# Patient Record
Sex: MALE | Race: WHITE | NOT HISPANIC OR LATINO
[De-identification: names, ages, dates, MRNs, and addresses within clinical notes are randomized per-mention and may not be internally consistent; named-entity substitution may affect disease eponyms.]

---

## 2018-08-08 PROBLEM — Z00.00 ENCOUNTER FOR PREVENTIVE HEALTH EXAMINATION: Status: ACTIVE | Noted: 2018-08-08

## 2018-08-21 ENCOUNTER — RECORD ABSTRACTING (OUTPATIENT)
Age: 73
End: 2018-08-21

## 2018-08-21 DIAGNOSIS — Z87.19 PERSONAL HISTORY OF OTHER DISEASES OF THE DIGESTIVE SYSTEM: ICD-10-CM

## 2018-08-21 DIAGNOSIS — Z86.69 PERSONAL HISTORY OF OTHER DISEASES OF THE NERVOUS SYSTEM AND SENSE ORGANS: ICD-10-CM

## 2018-08-21 DIAGNOSIS — Z78.9 OTHER SPECIFIED HEALTH STATUS: ICD-10-CM

## 2018-08-21 DIAGNOSIS — Z87.39 PERSONAL HISTORY OF OTHER DISEASES OF THE MUSCULOSKELETAL SYSTEM AND CONNECTIVE TISSUE: ICD-10-CM

## 2018-08-21 LAB — HBA1C MFR BLD: 8

## 2018-08-21 RX ORDER — SERTRALINE HYDROCHLORIDE 100 MG/1
100 TABLET, FILM COATED ORAL DAILY
Refills: 0 | Status: ACTIVE | COMMUNITY

## 2018-08-21 RX ORDER — LANCETS 28 GAUGE
EACH MISCELLANEOUS
Refills: 0 | Status: ACTIVE | COMMUNITY

## 2018-08-21 RX ORDER — SIMVASTATIN 20 MG/1
20 TABLET, FILM COATED ORAL
Qty: 90 | Refills: 3 | Status: ACTIVE | COMMUNITY

## 2018-08-21 RX ORDER — PEN NEEDLE, DIABETIC 29 G X1/2"
NEEDLE, DISPOSABLE MISCELLANEOUS
Refills: 0 | Status: ACTIVE | COMMUNITY

## 2018-08-21 RX ORDER — PREGABALIN 75 MG/1
75 CAPSULE ORAL DAILY
Refills: 0 | Status: ACTIVE | COMMUNITY

## 2018-08-21 RX ORDER — ADHESIVE REMOVER
PACKET (EA) MISCELLANEOUS
Refills: 0 | Status: ACTIVE | COMMUNITY

## 2018-08-21 RX ORDER — PANTOPRAZOLE 40 MG/1
40 TABLET, DELAYED RELEASE ORAL DAILY
Refills: 0 | Status: ACTIVE | COMMUNITY

## 2018-08-21 RX ORDER — QUETIAPINE FUMARATE 25 MG/1
25 TABLET ORAL DAILY
Refills: 0 | Status: ACTIVE | COMMUNITY

## 2018-08-21 RX ORDER — ALBUTEROL SULFATE 90 UG/1
108 (90 BASE) AEROSOL, METERED RESPIRATORY (INHALATION) DAILY
Refills: 0 | Status: ACTIVE | COMMUNITY

## 2018-09-07 ENCOUNTER — LABORATORY RESULT (OUTPATIENT)
Age: 73
End: 2018-09-07

## 2018-09-07 ENCOUNTER — APPOINTMENT (OUTPATIENT)
Dept: ENDOCRINOLOGY | Facility: CLINIC | Age: 73
End: 2018-09-07
Payer: MEDICARE

## 2018-09-07 VITALS
WEIGHT: 229 LBS | BODY MASS INDEX: 31.02 KG/M2 | SYSTOLIC BLOOD PRESSURE: 112 MMHG | HEIGHT: 72 IN | DIASTOLIC BLOOD PRESSURE: 70 MMHG | HEART RATE: 81 BPM

## 2018-09-07 LAB — GLUCOSE BLDC GLUCOMTR-MCNC: 211

## 2018-09-07 PROCEDURE — 82962 GLUCOSE BLOOD TEST: CPT

## 2018-09-07 PROCEDURE — 99214 OFFICE O/P EST MOD 30 MIN: CPT | Mod: 25

## 2018-09-21 ENCOUNTER — APPOINTMENT (OUTPATIENT)
Dept: ENDOCRINOLOGY | Facility: CLINIC | Age: 73
End: 2018-09-21
Payer: MEDICARE

## 2018-09-21 PROCEDURE — 97803 MED NUTRITION INDIV SUBSEQ: CPT

## 2018-10-12 ENCOUNTER — APPOINTMENT (OUTPATIENT)
Dept: ENDOCRINOLOGY | Facility: CLINIC | Age: 73
End: 2018-10-12
Payer: MEDICARE

## 2018-10-12 PROCEDURE — G0108 DIAB MANAGE TRN  PER INDIV: CPT

## 2018-10-26 ENCOUNTER — TRANSCRIPTION ENCOUNTER (OUTPATIENT)
Age: 73
End: 2018-10-26

## 2018-11-21 ENCOUNTER — APPOINTMENT (OUTPATIENT)
Dept: ENDOCRINOLOGY | Facility: CLINIC | Age: 73
End: 2018-11-21
Payer: MEDICARE

## 2018-11-21 PROCEDURE — 95249 CONT GLUC MNTR PT PROV EQP: CPT

## 2018-12-06 ENCOUNTER — RECORD ABSTRACTING (OUTPATIENT)
Age: 73
End: 2018-12-06

## 2018-12-11 ENCOUNTER — APPOINTMENT (OUTPATIENT)
Dept: ENDOCRINOLOGY | Facility: CLINIC | Age: 73
End: 2018-12-11
Payer: MEDICARE

## 2018-12-11 VITALS
HEART RATE: 75 BPM | WEIGHT: 225 LBS | OXYGEN SATURATION: 97 % | BODY MASS INDEX: 30.48 KG/M2 | DIASTOLIC BLOOD PRESSURE: 70 MMHG | SYSTOLIC BLOOD PRESSURE: 110 MMHG | HEIGHT: 72 IN

## 2018-12-11 DIAGNOSIS — R90.82 WHITE MATTER DISEASE, UNSPECIFIED: ICD-10-CM

## 2018-12-11 DIAGNOSIS — G47.33 OBSTRUCTIVE SLEEP APNEA (ADULT) (PEDIATRIC): ICD-10-CM

## 2018-12-11 LAB — GLUCOSE BLDC GLUCOMTR-MCNC: 259

## 2018-12-11 PROCEDURE — 82962 GLUCOSE BLOOD TEST: CPT

## 2018-12-11 PROCEDURE — 99214 OFFICE O/P EST MOD 30 MIN: CPT | Mod: 25

## 2018-12-11 PROCEDURE — 95251 CONT GLUC MNTR ANALYSIS I&R: CPT

## 2019-01-28 ENCOUNTER — RX RENEWAL (OUTPATIENT)
Age: 74
End: 2019-01-28

## 2019-01-31 ENCOUNTER — MEDICATION RENEWAL (OUTPATIENT)
Age: 74
End: 2019-01-31

## 2019-02-05 ENCOUNTER — MEDICATION RENEWAL (OUTPATIENT)
Age: 74
End: 2019-02-05

## 2019-02-14 ENCOUNTER — RX RENEWAL (OUTPATIENT)
Age: 74
End: 2019-02-14

## 2019-02-19 ENCOUNTER — RECORD ABSTRACTING (OUTPATIENT)
Age: 74
End: 2019-02-19

## 2019-03-29 ENCOUNTER — APPOINTMENT (OUTPATIENT)
Dept: ENDOCRINOLOGY | Facility: CLINIC | Age: 74
End: 2019-03-29
Payer: MEDICARE

## 2019-03-29 VITALS
SYSTOLIC BLOOD PRESSURE: 120 MMHG | DIASTOLIC BLOOD PRESSURE: 80 MMHG | BODY MASS INDEX: 30.61 KG/M2 | HEIGHT: 72 IN | HEART RATE: 80 BPM | WEIGHT: 226 LBS

## 2019-03-29 LAB — GLUCOSE BLDC GLUCOMTR-MCNC: 219

## 2019-03-29 PROCEDURE — 95251 CONT GLUC MNTR ANALYSIS I&R: CPT

## 2019-03-29 PROCEDURE — 99214 OFFICE O/P EST MOD 30 MIN: CPT | Mod: 25

## 2019-03-29 RX ORDER — METFORMIN ER 500 MG 500 MG/1
500 TABLET ORAL TWICE DAILY
Refills: 0 | Status: DISCONTINUED | COMMUNITY
End: 2019-03-29

## 2019-03-29 NOTE — REVIEW OF SYSTEMS
[Fatigue] : fatigue [Blurry Vision] : blurred vision [Dysphonia] : dysphonia [Headache] : headaches [Polydipsia] : polydipsia [Decreased Appetite] : appetite not decreased [Recent Weight Gain (___ Lbs)] : no recent weight gain [Recent Weight Loss (___ Lbs)] : no recent weight loss [Visual Field Defect] : no visual field defect [Dysphagia] : no dysphagia [Neck Pain] : no neck pain [Chest Pain] : no chest pain [Palpitations] : no palpitations [Constipation] : no constipation [Diarrhea] : no diarrhea [Polyuria] : no polyuria [Dysuria] : no dysuria [Tremors] : no tremors [Depression] : no depression [Anxiety] : no anxiety [Cold Intolerance] : cold tolerant [Heat Intolerance] : heat tolerant [Easy Bruising] : no tendency for easy bruising [Swelling] : no swelling [FreeTextEntry2] : weight stable  [FreeTextEntry3] : sometimes [FreeTextEntry4] : only first thing in am  [de-identified] : hx of migraines

## 2019-03-29 NOTE — ASSESSMENT
[FreeTextEntry1] : 74 y/o male with Type 2 DM, Hyperlipidemia, and HTN. No recent labs. \par \par Plan: \par Type 2 DM: labs now \par - Increase Omnipod basal settings:\par 12am-12pm 2.0 to 2.3\par 12pm-4pm 1.8 to 2.0\par 4pm-12am 2.0 to 2.3\par - continue carb ratio and ISF\par - continue Dexcom G5\par - encouraged to place carbs in pump\par - educated on healthy food choices\par - encouraged to continue routine exercise\par - schedule appointment with CDE to review carb counting and pump re-fresher \par \par Hyperlipidemia: labs now, continue Simvastatin\par \par HTN: stable. continue current medication regimen \par \par Labs and follow up visit in 3 months. \par \par Plan reviewed with Dr. Forbes

## 2019-03-29 NOTE — PHYSICAL EXAM
[Alert] : alert [No Acute Distress] : no acute distress [Well Nourished] : well nourished [Well Developed] : well developed [Normal Sclera/Conjunctiva] : normal sclera/conjunctiva [EOMI] : extra ocular movement intact [Normal Hearing] : hearing was normal [Supple] : the neck was supple [No LAD] : no lymphadenopathy [Thyroid Not Enlarged] : the thyroid was not enlarged [No Thyroid Nodules] : there were no palpable thyroid nodules [Normal Rate and Effort] : normal respiratory rhythm and effort [No Accessory Muscle Use] : no accessory muscle use [Clear to Auscultation] : lungs were clear to auscultation bilaterally [Pedal Pulses Normal] : the pedal pulses are present [Normal Bowel Sounds] : normal bowel sounds [Not Tender] : non-tender [Soft] : abdomen soft [Normal Gait] : normal gait [Acanthosis Nigricans] : no acanthosis nigricans [Right Foot Was Examined] : right foot ~C was examined [Left Foot Was Examined] : left foot ~C was examined [Tenderness] : not tender [Erythema] : not erythematous [Normal] : normal [Full ROM] : with full range of motion [#1 Diminished] : number 1 was diminished [#2 Diminished] : number 2 was diminished [#3 Diminished] : number 3 was diminished [#4 Diminished] : number 4 was diminished [#5 Diminished] : number 5 was diminished [No Tremors] : no tremors [Oriented x3] : oriented to person, place, and time [Normal Insight/Judgement] : insight and judgment were intact [Normal Mood] : the mood was normal [de-identified] : trace of edema in left foot  [FreeTextEntry5] : trace of edema

## 2019-03-29 NOTE — HISTORY OF PRESENT ILLNESS
[FreeTextEntry1] : Quality: Type 2 DM\par Severity: moderate\par Duration: 1990s \par Onset:fatigue\par Modifying Factors: Better with insulin \par Associated Symptoms: neuropathy. nephropathy \par \par Current Regimen:\par Novolog via Omnipod\par \par - stopped Metformin r/t gi discomfort\par \par Self blood sugar monitoring: Dexcom G5 - download - average glucose 204, standard deviation 75\par Omnipod download - carbs are not consistently placed into pump, limited BS readings, average glucose 229\par Pt. reports of having high blood sugars, he was not wearing the pump for weeks r/t insurance issues, he re-stared wearing the pump a couple weeks ago \par \par exercise: walk daily\par \par Diet:\par B- oatmeal, eggs, toast\par L- skips\par D- steak, rice, noodles, pasta\par Snacks - occasional ice cream\par \par Date of last eye exam: 2018 (-) diabetic retinopathy \par Date of last foot exam:  2018\par Date of last flu vaccine: 2019\par Date of last Pneumovax: < 5 years ago

## 2019-04-01 ENCOUNTER — RX RENEWAL (OUTPATIENT)
Age: 74
End: 2019-04-01

## 2019-04-01 ENCOUNTER — RESULT REVIEW (OUTPATIENT)
Age: 74
End: 2019-04-01

## 2019-04-05 ENCOUNTER — RESULT REVIEW (OUTPATIENT)
Age: 74
End: 2019-04-05

## 2019-04-05 LAB
25(OH)D3 SERPL-MCNC: 16.8 NG/ML
ALBUMIN SERPL ELPH-MCNC: 4.6 G/DL
ALP BLD-CCNC: 87 U/L
ALT SERPL-CCNC: 24 U/L
ANION GAP SERPL CALC-SCNC: 16 MMOL/L
AST SERPL-CCNC: 25 U/L
BASOPHILS # BLD AUTO: 0.11 K/UL
BASOPHILS NFR BLD AUTO: 1.1 %
BILIRUB SERPL-MCNC: 0.4 MG/DL
BUN SERPL-MCNC: 18 MG/DL
CALCIUM SERPL-MCNC: 9.5 MG/DL
CHLORIDE SERPL-SCNC: 103 MMOL/L
CHOLEST SERPL-MCNC: 163 MG/DL
CHOLEST/HDLC SERPL: 4.4 RATIO
CO2 SERPL-SCNC: 23 MMOL/L
CREAT SERPL-MCNC: 1.19 MG/DL
EOSINOPHIL # BLD AUTO: 0.49 K/UL
EOSINOPHIL NFR BLD AUTO: 5 %
ESTIMATED AVERAGE GLUCOSE: 252 MG/DL
GLUCOSE SERPL-MCNC: 251 MG/DL
HBA1C MFR BLD HPLC: 10.4 %
HCT VFR BLD CALC: 44.1 %
HDLC SERPL-MCNC: 37 MG/DL
HGB BLD-MCNC: 14 G/DL
IMM GRANULOCYTES NFR BLD AUTO: 0.5 %
LDLC SERPL CALC-MCNC: 83 MG/DL
LYMPHOCYTES # BLD AUTO: 2.71 K/UL
LYMPHOCYTES NFR BLD AUTO: 27.7 %
MAGNESIUM SERPL-MCNC: 1.9 MG/DL
MAN DIFF?: NORMAL
MCHC RBC-ENTMCNC: 30.9 PG
MCHC RBC-ENTMCNC: 31.7 GM/DL
MCV RBC AUTO: 97.4 FL
MONOCYTES # BLD AUTO: 0.85 K/UL
MONOCYTES NFR BLD AUTO: 8.7 %
NEUTROPHILS # BLD AUTO: 5.59 K/UL
NEUTROPHILS NFR BLD AUTO: 57 %
PHOSPHATE SERPL-MCNC: 2.9 MG/DL
PLATELET # BLD AUTO: 185 K/UL
POTASSIUM SERPL-SCNC: 4.7 MMOL/L
PROT SERPL-MCNC: 7.8 G/DL
RBC # BLD: 4.53 M/UL
RBC # FLD: 13.4 %
SODIUM SERPL-SCNC: 142 MMOL/L
TRIGL SERPL-MCNC: 215 MG/DL
TSH SERPL-ACNC: 1.51 UIU/ML
VIT B12 SERPL-MCNC: 552 PG/ML
WBC # FLD AUTO: 9.8 K/UL

## 2019-04-07 ENCOUNTER — TRANSCRIPTION ENCOUNTER (OUTPATIENT)
Age: 74
End: 2019-04-07

## 2019-05-02 ENCOUNTER — RX RENEWAL (OUTPATIENT)
Age: 74
End: 2019-05-02

## 2019-05-03 ENCOUNTER — APPOINTMENT (OUTPATIENT)
Dept: ENDOCRINOLOGY | Facility: CLINIC | Age: 74
End: 2019-05-03

## 2019-05-22 ENCOUNTER — RX RENEWAL (OUTPATIENT)
Age: 74
End: 2019-05-22

## 2019-07-09 ENCOUNTER — APPOINTMENT (OUTPATIENT)
Dept: ENDOCRINOLOGY | Facility: CLINIC | Age: 74
End: 2019-07-09
Payer: MEDICARE

## 2019-07-09 VITALS
HEIGHT: 72 IN | DIASTOLIC BLOOD PRESSURE: 70 MMHG | BODY MASS INDEX: 30.48 KG/M2 | WEIGHT: 225 LBS | HEART RATE: 75 BPM | SYSTOLIC BLOOD PRESSURE: 136 MMHG

## 2019-07-09 LAB — GLUCOSE BLDC GLUCOMTR-MCNC: 172

## 2019-07-09 PROCEDURE — 82962 GLUCOSE BLOOD TEST: CPT

## 2019-07-09 PROCEDURE — 99214 OFFICE O/P EST MOD 30 MIN: CPT | Mod: 25

## 2019-07-14 NOTE — REVIEW OF SYSTEMS
[Recent Weight Gain (___ Lbs)] : no recent weight gain [Chest Pain] : no chest pain [Palpitations] : no palpitations [SOB on Exertion] : shortness of breath during exertion [Nausea] : no nausea [Vomiting] : no vomiting was observed [Polyuria] : no polyuria [Joint Pain] : joint pain [Pain/Numbness of Digits] : pain/numbness of digits [Polydipsia] : polydipsia [FreeTextEntry3] : no changes in vision [FreeTextEntry9] : B/L hands [de-identified] : B/L feet

## 2019-07-14 NOTE — PHYSICAL EXAM
[Alert] : alert [No Acute Distress] : no acute distress [Well Nourished] : well nourished [Well Developed] : well developed [Normal Sclera/Conjunctiva] : normal sclera/conjunctiva [EOMI] : extra ocular movement intact [No Proptosis] : no proptosis [Thyroid Not Enlarged] : the thyroid was not enlarged [No Thyroid Nodules] : there were no palpable thyroid nodules [No Respiratory Distress] : no respiratory distress [No Accessory Muscle Use] : no accessory muscle use [Clear to Auscultation] : lungs were clear to auscultation bilaterally [Normal Rate] : heart rate was normal  [Normal S1, S2] : normal S1 and S2 [No Edema] : there was no peripheral edema [Anterior Cervical Nodes] : anterior cervical nodes [Normal] : normal and non tender [Spine Straight] : spine straight [No Stigmata of Cushings Syndrome] : no stigmata of cushings syndrome [Acanthosis Nigricans] : no acanthosis nigricans [Normal Reflexes] : deep tendon reflexes were 2+ and symmetric [No Tremors] : no tremors [Oriented x3] : oriented to person, place, and time [de-identified] : occasional extra systole [de-identified] : flat affect

## 2019-07-14 NOTE — ASSESSMENT
[FreeTextEntry1] : 73 year old male with T2DM on CSII, also with hypertension, hyperlipidemia, and vitamin D and vitamin B deficiencies. He is having afternoon hypoglycemia and post prandial hyperglycemia with dinner.\par \par 1. T2DM- improving control   pt basal now set at \par 12 AM -12 AM - 3.0 unit per hour only \par \par will reduce  12 Am to 6 Am  2.75  as pt had overnight lows\par 6Am to 12AM- 3.0 unit per hour \par -willsee if  dexocm has different adhesive \par  if not can cosnider Moise \par \par  COntinue Premeal injecitons using 1:15 carb ratio- ( set in pump by pt already)  and sensitivity 1:50 .\par -Tighten ICR at 5pm from 1:8 to 1:7 to help with post prandial hyperglycemia after dinner.\par -Continue SMBG. Call office with lows.\par -Repeat A1c in 3 months.\par \par 2. Hypertension- controlled.\par -Continue current regimen.\par \par 3. Hyperlipidemia- mild triglyceride elevation.\par -Continue statin.\par -Low fat diet.\par -Repeat lipids in 3 months.\par \par 4. Vitamin D deficiency\par -Cont Vitamin D 2000 IU daily.\par -Repeat level in 3 months.

## 2019-07-14 NOTE — HISTORY OF PRESENT ILLNESS
[FreeTextEntry1] : Type: 2\par Severity: moderate\par Duration: years\par Associated symptoms: neuropathy\par Modifying factors: better with carb counting\par \par Current meds for glycemic control:\par Metofrmin 500 mg BID- does not take consistently because it causes stomach ache\par Omnipod with Novolog, changes infusion site every 2-3 days\par using Omnipod pump for basal rate-\par takes Novolog pens for inhjection for mealtime boluses - if does not- runs out of insuin too quickly \par SMBG has not been using DExcom - had reaction to adhesive - very itchy \par \par Last eye exam: July 2019 -, mild background DM retinopathy \par Last foot exam: November 2018, history of neuropathy\par Diet: carb counting\par Weight: stable\par Exercise: 3x per week, 20-30 minutes\par \par \par settings differetn in pump  than last time

## 2019-08-13 ENCOUNTER — RX RENEWAL (OUTPATIENT)
Age: 74
End: 2019-08-13

## 2019-08-15 ENCOUNTER — RX RENEWAL (OUTPATIENT)
Age: 74
End: 2019-08-15

## 2019-10-13 ENCOUNTER — RX RENEWAL (OUTPATIENT)
Age: 74
End: 2019-10-13

## 2019-11-25 LAB
HBA1C MFR BLD HPLC: 7.4
LDLC SERPL DIRECT ASSAY-MCNC: 67
MICROALBUMIN/CREAT 24H UR-RTO: 149.3

## 2019-11-26 ENCOUNTER — APPOINTMENT (OUTPATIENT)
Dept: ENDOCRINOLOGY | Facility: CLINIC | Age: 74
End: 2019-11-26
Payer: MEDICARE

## 2019-11-26 VITALS
HEART RATE: 74 BPM | WEIGHT: 240 LBS | BODY MASS INDEX: 32.51 KG/M2 | HEIGHT: 72 IN | SYSTOLIC BLOOD PRESSURE: 136 MMHG | OXYGEN SATURATION: 97 % | DIASTOLIC BLOOD PRESSURE: 78 MMHG

## 2019-11-26 LAB — GLUCOSE BLDC GLUCOMTR-MCNC: 156

## 2019-11-26 PROCEDURE — 82962 GLUCOSE BLOOD TEST: CPT

## 2019-11-26 PROCEDURE — 99214 OFFICE O/P EST MOD 30 MIN: CPT | Mod: 25

## 2019-11-26 NOTE — ASSESSMENT
[FreeTextEntry1] : 74 year old male with T2DM on CSII, also with hypertension, hyperlipidemia, and vitamin D and vitamin B deficiencies. \par 1. T2DM- improving control   pt basal now set at \par keep  basal on omnipod at 3 untis per hour \par  some variable numbers - Pt bolusing via injections \par \par  COntinue Premeal injecitons using 1:15 carb ratio- ( set in pump by pt already)  and sensitivity 1:50 .\par \par -Continue SMBG. Call office with lows.\par -Repeat A1c in 3 months.\par \par 2. Hypertension- controlled.\par -Continue current regimen.\par \par 3. Hyperlipidemia- mild triglyceride elevation.\par -Continue statin.\par -Low fat diet.\par -Repeat lipids in 3 months.\par \par 4. Vitamin D deficiency\par -Cont Vitamin D 2000 IU daily.\par -Repeat level in 3 months.\par \par 5. Knee and back pain- see orthopedist

## 2019-11-26 NOTE — HISTORY OF PRESENT ILLNESS
[FreeTextEntry1] : Type: 2\par Severity: moderate\par Duration: years\par Associated symptoms: neuropathy\par Modifying factors: better with carb counting\par \par Omnipod with Novolog, changes infusion site every 2-3 days\par using Omnipod pump for basal rate-\par takes Novolog pens for injection for mealtime boluses - if does not- runs out of insulin too quickly \par SMBG has not been using Dexcom - had reaction to adhesive - very itchy \par \par Last eye exam: July 2019 -, mild background DM retinopathy \par Last foot exam:July 2019 , history of neuropathy\par Diet: carb counting\par Weight: stable\par Exercise: 3x per week, 20-30 minutes\par \par \par has been feeling ok overall  been trying to rotate  infuons sites \par \par \par  some insomnia \par does watch TV at [employer]- uses [employer]  watches movie \par \par some lwoer back pain   and swelling in kneses-  did [place] PMD

## 2019-11-26 NOTE — REVIEW OF SYSTEMS
[SOB on Exertion] : shortness of breath during exertion [Joint Pain] : joint pain [Pain/Numbness of Digits] : pain/numbness of digits [Polydipsia] : polydipsia [Recent Weight Gain (___ Lbs)] : no recent weight gain [Chest Pain] : no chest pain [Palpitations] : no palpitations [Nausea] : no nausea [Vomiting] : no vomiting was observed [FreeTextEntry3] : no changes in vision [Polyuria] : no polyuria

## 2019-11-26 NOTE — PHYSICAL EXAM
[Alert] : alert [Well Nourished] : well nourished [No Acute Distress] : no acute distress [Well Developed] : well developed [Normal Sclera/Conjunctiva] : normal sclera/conjunctiva [EOMI] : extra ocular movement intact [No Proptosis] : no proptosis [No Respiratory Distress] : no respiratory distress [No Thyroid Nodules] : there were no palpable thyroid nodules [Thyroid Not Enlarged] : the thyroid was not enlarged [No Accessory Muscle Use] : no accessory muscle use [Normal Rate] : heart rate was normal  [Clear to Auscultation] : lungs were clear to auscultation bilaterally [No Edema] : there was no peripheral edema [Normal S1, S2] : normal S1 and S2 [Anterior Cervical Nodes] : anterior cervical nodes [No Stigmata of Cushings Syndrome] : no stigmata of cushings syndrome [Spine Straight] : spine straight [No Tremors] : no tremors [Normal Reflexes] : deep tendon reflexes were 2+ and symmetric [Oriented x3] : oriented to person, place, and time [Acanthosis Nigricans] : no acanthosis nigricans [Right Foot Was Examined] : right foot ~C was examined [Left Foot Was Examined] : left foot ~C was examined [Full ROM] : with full range of motion [Normal] : normal [2+] : 2+ in the dorsalis pedis [de-identified] : occasional extra systole

## 2019-12-06 ENCOUNTER — RX RENEWAL (OUTPATIENT)
Age: 74
End: 2019-12-06

## 2019-12-13 ENCOUNTER — RX RENEWAL (OUTPATIENT)
Age: 74
End: 2019-12-13

## 2019-12-23 ENCOUNTER — RX RENEWAL (OUTPATIENT)
Age: 74
End: 2019-12-23

## 2020-02-07 ENCOUNTER — RX RENEWAL (OUTPATIENT)
Age: 75
End: 2020-02-07

## 2020-03-04 LAB
HBA1C MFR BLD HPLC: 7.8
LDLC SERPL DIRECT ASSAY-MCNC: 188
MICROALBUMIN/CREAT 24H UR-RTO: 19

## 2020-03-05 ENCOUNTER — APPOINTMENT (OUTPATIENT)
Dept: ENDOCRINOLOGY | Facility: CLINIC | Age: 75
End: 2020-03-05
Payer: MEDICARE

## 2020-03-05 VITALS
OXYGEN SATURATION: 97 % | HEIGHT: 72 IN | BODY MASS INDEX: 31.83 KG/M2 | SYSTOLIC BLOOD PRESSURE: 110 MMHG | HEART RATE: 74 BPM | WEIGHT: 235 LBS | DIASTOLIC BLOOD PRESSURE: 80 MMHG

## 2020-03-05 DIAGNOSIS — R79.89 OTHER SPECIFIED ABNORMAL FINDINGS OF BLOOD CHEMISTRY: ICD-10-CM

## 2020-03-05 LAB — GLUCOSE BLDC GLUCOMTR-MCNC: 97

## 2020-03-05 PROCEDURE — 99214 OFFICE O/P EST MOD 30 MIN: CPT | Mod: 25

## 2020-03-05 PROCEDURE — 82962 GLUCOSE BLOOD TEST: CPT

## 2020-03-05 NOTE — REVIEW OF SYSTEMS
[SOB on Exertion] : shortness of breath during exertion [Joint Pain] : joint pain [Pain/Numbness of Digits] : pain/numbness of digits [Polydipsia] : polydipsia [Recent Weight Gain (___ Lbs)] : no recent weight gain [Chest Pain] : no chest pain [Palpitations] : no palpitations [Nausea] : no nausea [Vomiting] : no vomiting was observed [Polyuria] : no polyuria [FreeTextEntry3] : no changes in vision [FreeTextEntry9] : kne has been swollen  has bakers cyst - to see ortho tomorrow

## 2020-03-05 NOTE — PHYSICAL EXAM
[Alert] : alert [No Acute Distress] : no acute distress [Well Nourished] : well nourished [Well Developed] : well developed [Normal Sclera/Conjunctiva] : normal sclera/conjunctiva [EOMI] : extra ocular movement intact [No Proptosis] : no proptosis [Thyroid Not Enlarged] : the thyroid was not enlarged [No Thyroid Nodules] : there were no palpable thyroid nodules [No Respiratory Distress] : no respiratory distress [No Accessory Muscle Use] : no accessory muscle use [Clear to Auscultation] : lungs were clear to auscultation bilaterally [Normal Rate] : heart rate was normal  [Normal S1, S2] : normal S1 and S2 [No Edema] : there was no peripheral edema [Anterior Cervical Nodes] : anterior cervical nodes [Spine Straight] : spine straight [No Stigmata of Cushings Syndrome] : no stigmata of cushings syndrome [Right Foot Was Examined] : right foot ~C was examined [Left Foot Was Examined] : left foot ~C was examined [Normal] : normal [Full ROM] : with full range of motion [2+] : 2+ in the dorsalis pedis [Normal Reflexes] : deep tendon reflexes were 2+ and symmetric [No Tremors] : no tremors [Oriented x3] : oriented to person, place, and time [Acanthosis Nigricans] : no acanthosis nigricans

## 2020-03-05 NOTE — ASSESSMENT
[FreeTextEntry1] : 74 year old male with T2DM on CSII, also with hypertension, hyperlipidemia, and vitamin D and vitamin B deficiencies. \par 1. T2DM- with some PM hypoglycemia \par will reduce basal rate  to 2.75 u/hr\par pt does not want to do carb counting - wants to do things the way he has bee n with bolusing with novolog pen \par \par intereseted in OmniPOd  DAsh \par \par  He does not use carb ratio inpump or CF .\par \par -Continue SMBG. Call office with lows.\par -Repeat A1c in 3 months.\par \par 2. Hypertension- controlled.\par -Continue current regimen.\par \par 3. Hyperlipidemia- mild triglyceride elevation.\par -Continue statin.\par -Low fat diet.\par -Repeat lipids in 3 months.\par \par 4. Vitamin D deficiency\par -Cont Vitamin D 2000 IU daily.\par -Repeat level in 3 months.\par \par 5. Knee and back pain- seeing orthopedist  or

## 2020-03-05 NOTE — HISTORY OF PRESENT ILLNESS
[FreeTextEntry1] : Type: 2\par Severity: moderate\par Duration: years\par Associated symptoms: neuropathy\par Modifying factors:  pt  does not carb count\par really uses \par \par Omnipod with Novolog, changes infusion site every 2-3 days\par using Omnipod pump for basal rate-\par takes Novolog pens for injection for mealtime boluses - f takae 8-12 untis if has full cup of ricwe and  bread \par SMBG has not been using Dexcom - had reaction to adhesive - very itchy \par \par Last eye exam: July 2019 -, mild background DM retinopathy \par Last foot exam:July 2019 , history of neuropathy\par Diet: carb counting\par Weight: stable\par Exercise: 3x per week, 20-30 minutes\par \par \par has been feeling ok overall  been trying to rotate  iinfusion sets \par \par \par  some insomnia \par does watch TV at [employer]- uses [employer]  watches movie \par \par some lwoer back pain   and swelling in kneses-  did [place] PMD

## 2020-04-28 ENCOUNTER — RX RENEWAL (OUTPATIENT)
Age: 75
End: 2020-04-28

## 2020-06-16 ENCOUNTER — RX RENEWAL (OUTPATIENT)
Age: 75
End: 2020-06-16

## 2020-07-23 ENCOUNTER — APPOINTMENT (OUTPATIENT)
Dept: ENDOCRINOLOGY | Facility: CLINIC | Age: 75
End: 2020-07-23
Payer: MEDICARE

## 2020-07-23 VITALS
WEIGHT: 226 LBS | HEART RATE: 76 BPM | DIASTOLIC BLOOD PRESSURE: 74 MMHG | SYSTOLIC BLOOD PRESSURE: 126 MMHG | BODY MASS INDEX: 30.61 KG/M2 | HEIGHT: 72 IN

## 2020-07-23 PROCEDURE — 99214 OFFICE O/P EST MOD 30 MIN: CPT | Mod: 25

## 2020-07-23 PROCEDURE — 82962 GLUCOSE BLOOD TEST: CPT

## 2020-07-27 NOTE — REVIEW OF SYSTEMS
[Chest Pain] : no chest pain [Palpitations] : no palpitations [SOB on Exertion] : shortness of breath during exertion [Recent Weight Gain (___ Lbs)] : no recent weight gain [Vomiting] : no vomiting was observed [Polyuria] : no polyuria [Nausea] : no nausea [Pain/Numbness of Digits] : pain/numbness of digits [Polydipsia] : polydipsia [Joint Pain] : joint pain [FreeTextEntry9] : kne has been swollen  has bakers cyst - to see ortho tomorrow  [FreeTextEntry3] : no changes in vision

## 2020-07-27 NOTE — PHYSICAL EXAM
[Alert] : alert [Normal Sclera/Conjunctiva] : normal sclera/conjunctiva [Well Developed] : well developed [No Acute Distress] : no acute distress [Well Nourished] : well nourished [No Proptosis] : no proptosis [EOMI] : extra ocular movement intact [Normal Oropharynx] : the oropharynx was normal [No Accessory Muscle Use] : no accessory muscle use [No Respiratory Distress] : no respiratory distress [Thyroid Not Enlarged] : the thyroid was not enlarged [No Thyroid Nodules] : no palpable thyroid nodules [Normal S1, S2] : normal S1 and S2 [Clear to Auscultation] : lungs were clear to auscultation bilaterally [Normal Rate] : heart rate was normal [Regular Rhythm] : with a regular rhythm [No Edema] : no peripheral edema [Pedal Pulses Normal] : the pedal pulses are present [Normal Posterior Cervical Nodes] : no posterior cervical lymphadenopathy [No Spinal Tenderness] : no spinal tenderness [Normal Anterior Cervical Nodes] : no anterior cervical lymphadenopathy [Normal Gait] : normal gait [No Stigmata of Cushings Syndrome] : no stigmata of Cushings Syndrome [Spine Straight] : spine straight [Normal Strength/Tone] : muscle strength and tone were normal [No Rash] : no rash [Acanthosis Nigricans] : no acanthosis nigricans [Diminished Throughout Both Feet] : normal tactile sensation with monofilament testing throughout both feet [Full ROM] : with full range of motion [Normal] : normal [Normal Reflexes] : deep tendon reflexes were 2+ and symmetric [No Tremors] : no tremors [Oriented x3] : oriented to person, place, and time

## 2020-07-27 NOTE — ASSESSMENT
[FreeTextEntry1] : 74 year old male with T2DM on CSII, also with hypertension, hyperlipidemia, and vitamin D and vitamin B deficiencies. \par 1. T2DM- using Omnipod dash \par cont curent RX e basal rate  to 2.75 u/hr\par pt does not want to do carb counting - wants to do things the way he has bee n with bolusing with novolog pen \par \par \par  He does not use carb ratio inpump or CF .\par \par -Continue SMBG. Call office with lows.\par -Repeat A1c in 3 months.\par \par 2. Hypertension- controlled.\par -Continue current regimen.\par \par 3. Hyperlipidemia- mild triglyceride elevation.\par -Continue statin.\par -Low fat diet.\par -Repeat lipids in 3 months.\par \par 4. Vitamin D deficiency\par -Cont Vitamin D 2000 IU daily.\par -Repeat level in 3 months.\par \par 5. Knee and back pain- seeing orthopedist  or

## 2020-09-09 ENCOUNTER — APPOINTMENT (OUTPATIENT)
Dept: ENDOCRINOLOGY | Facility: CLINIC | Age: 75
End: 2020-09-09
Payer: COMMERCIAL

## 2020-09-09 PROCEDURE — P0005: CPT

## 2020-09-11 RX ORDER — BLOOD SUGAR DIAGNOSTIC
STRIP MISCELLANEOUS
Qty: 400 | Refills: 1 | Status: ACTIVE | COMMUNITY
Start: 2020-09-11 | End: 1900-01-01

## 2020-09-11 RX ORDER — BLOOD SUGAR DIAGNOSTIC
STRIP MISCELLANEOUS
Qty: 400 | Refills: 1 | Status: DISCONTINUED | COMMUNITY
Start: 2019-02-14 | End: 2020-09-11

## 2020-09-11 RX ORDER — BLOOD SUGAR DIAGNOSTIC
STRIP MISCELLANEOUS
Refills: 0 | Status: DISCONTINUED | COMMUNITY
End: 2020-09-11

## 2020-09-24 RX ORDER — GLUCAGON INJECTION, SOLUTION 1 MG/.2ML
1 INJECTION, SOLUTION SUBCUTANEOUS
Qty: 1 | Refills: 3 | Status: ACTIVE | COMMUNITY
Start: 2020-09-11 | End: 1900-01-01

## 2020-10-16 ENCOUNTER — RX RENEWAL (OUTPATIENT)
Age: 75
End: 2020-10-16

## 2020-10-18 ENCOUNTER — RX RENEWAL (OUTPATIENT)
Age: 75
End: 2020-10-18

## 2020-10-21 RX ORDER — INSULIN ASPART 100 [IU]/ML
100 INJECTION, SOLUTION INTRAVENOUS; SUBCUTANEOUS
Qty: 90 | Refills: 1 | Status: ACTIVE | COMMUNITY
Start: 2020-06-16 | End: 1900-01-01

## 2020-12-07 ENCOUNTER — APPOINTMENT (OUTPATIENT)
Dept: ENDOCRINOLOGY | Facility: CLINIC | Age: 75
End: 2020-12-07
Payer: MEDICARE

## 2020-12-07 VITALS
HEART RATE: 79 BPM | DIASTOLIC BLOOD PRESSURE: 80 MMHG | OXYGEN SATURATION: 94 % | BODY MASS INDEX: 31.15 KG/M2 | WEIGHT: 230 LBS | HEIGHT: 72 IN | SYSTOLIC BLOOD PRESSURE: 120 MMHG

## 2020-12-07 LAB — GLUCOSE BLDC GLUCOMTR-MCNC: 155

## 2020-12-07 PROCEDURE — 82962 GLUCOSE BLOOD TEST: CPT

## 2020-12-07 PROCEDURE — 99214 OFFICE O/P EST MOD 30 MIN: CPT | Mod: 25

## 2020-12-07 RX ORDER — FENOFIBRATE 160 MG/1
160 TABLET ORAL
Qty: 90 | Refills: 0 | Status: ACTIVE | COMMUNITY
Start: 2020-12-02

## 2020-12-07 RX ORDER — ISOPROPYL ALCOHOL 0.7 ML/ML
SWAB TOPICAL
Qty: 4 | Refills: 3 | Status: ACTIVE | COMMUNITY
Start: 1900-01-01 | End: 1900-01-01

## 2020-12-07 NOTE — PHYSICAL EXAM
[Alert] : alert [Well Nourished] : well nourished [No Acute Distress] : no acute distress [Well Developed] : well developed [Normal Sclera/Conjunctiva] : normal sclera/conjunctiva [EOMI] : extra ocular movement intact [No Proptosis] : no proptosis [Normal Oropharynx] : the oropharynx was normal [Thyroid Not Enlarged] : the thyroid was not enlarged [No Thyroid Nodules] : no palpable thyroid nodules [No Respiratory Distress] : no respiratory distress [No Accessory Muscle Use] : no accessory muscle use [Clear to Auscultation] : lungs were clear to auscultation bilaterally [Normal S1, S2] : normal S1 and S2 [Normal Rate] : heart rate was normal [Regular Rhythm] : with a regular rhythm [No Edema] : no peripheral edema [Pedal Pulses Normal] : the pedal pulses are present [Normal Anterior Cervical Nodes] : no anterior cervical lymphadenopathy [No Stigmata of Cushings Syndrome] : no stigmata of Cushings Syndrome [Normal Gait] : normal gait [Normal Strength/Tone] : muscle strength and tone were normal [No Rash] : no rash [Normal] : normal [Full ROM] : with full range of motion [Normal Reflexes] : deep tendon reflexes were 2+ and symmetric [No Tremors] : no tremors [Oriented x3] : oriented to person, place, and time [Acanthosis Nigricans] : no acanthosis nigricans [Diminished Throughout Both Feet] : normal tactile sensation with monofilament testing throughout both feet [de-identified] : legft neck muscles tense

## 2020-12-07 NOTE — ASSESSMENT
[FreeTextEntry1] : 74 year old male with T2DM on CSII, also with hypertension, hyperlipidemia, and vitamin D and vitamin B deficiencies. \par 1. T2DM- using Omnipod dash   not available for download today \par cont curent RX e basal rate  to 2.75 u/hr\par pt does not want to do carb counting - wants to do things the way he has bee n with bolusing with novolog pen \par \par -Continue SMBG. Call office with lows.\par -await BW from \par PMD \par \par 2. Hypertension- controlled.\par -Continue current regimen.\par \par 3. Hyperlipidemia- mild triglyceride elevation.\par -Continue statin.\par -Low fat diet.\par -Repeat lipids in 3 months.\par \par 4. Vitamin D deficiency\par -Cont Vitamin D 50K per week - need updated labs\par -Repeat level in 3 months.\par \par \par 5 Dyspnea due to COvid on pred taper- will follwoupwith pulm

## 2020-12-07 NOTE — HISTORY OF PRESENT ILLNESS
[FreeTextEntry1] : Type: 2\par Severity: moderate\par Duration: years\par Associated symptoms: neuropathy\par Modifying factors:  pt  does not carb count\par \par Omnipod with Novolog, changes infusion site every 2-3 days\par using Omnipod pump for basal rate-\par takes Novolog pens for injection for mealtime boluses - takign 18 untis tid ac  if has full cup of ricwe and  bread \par \par has been on prednsiosne taper for past 5 days from COVID from [facility] \par \par  SMBG has not been using Dexcom - had reaction to adhesive - very itchy \par \par Last eye exam: July 2019 -, mild background DM retinopathy \par Last foot exam:July 2019 , history of neuropathy\par Diet: carb counting\par Weight: stable\par Exercise: linmited by dyspnea \par \par \par has been feeling ok overall  been trying to rotate  iinfusion sets \par \par \par  did twist ankle about 1 month ago- was swollen  now better

## 2021-01-15 ENCOUNTER — RX RENEWAL (OUTPATIENT)
Age: 76
End: 2021-01-15

## 2021-02-23 ENCOUNTER — LABORATORY RESULT (OUTPATIENT)
Age: 76
End: 2021-02-23

## 2021-03-08 ENCOUNTER — APPOINTMENT (OUTPATIENT)
Dept: ENDOCRINOLOGY | Facility: CLINIC | Age: 76
End: 2021-03-08
Payer: MEDICARE

## 2021-03-08 ENCOUNTER — RX RENEWAL (OUTPATIENT)
Age: 76
End: 2021-03-08

## 2021-03-08 VITALS
HEART RATE: 69 BPM | SYSTOLIC BLOOD PRESSURE: 120 MMHG | DIASTOLIC BLOOD PRESSURE: 80 MMHG | OXYGEN SATURATION: 96 % | HEIGHT: 72 IN | BODY MASS INDEX: 31.83 KG/M2 | WEIGHT: 235 LBS

## 2021-03-08 PROCEDURE — 99214 OFFICE O/P EST MOD 30 MIN: CPT

## 2021-03-08 RX ORDER — DONEPEZIL HYDROCHLORIDE 10 MG/1
10 TABLET ORAL
Qty: 30 | Refills: 0 | Status: ACTIVE | COMMUNITY
Start: 2020-12-15

## 2021-03-08 RX ORDER — AZELASTINE HYDROCHLORIDE 0.5 MG/ML
0.05 SOLUTION/ DROPS OPHTHALMIC
Qty: 6 | Refills: 0 | Status: ACTIVE | COMMUNITY
Start: 2021-02-04

## 2021-04-06 ENCOUNTER — APPOINTMENT (OUTPATIENT)
Dept: ENDOCRINOLOGY | Facility: CLINIC | Age: 76
End: 2021-04-06
Payer: MEDICARE

## 2021-04-06 PROCEDURE — G0108 DIAB MANAGE TRN  PER INDIV: CPT

## 2021-04-06 RX ORDER — FLASH GLUCOSE SENSOR
KIT MISCELLANEOUS
Qty: 2 | Refills: 5 | Status: ACTIVE | COMMUNITY
Start: 2021-04-06 | End: 1900-01-01

## 2021-04-13 NOTE — ASSESSMENT
[FreeTextEntry1] : 74 year old male with T2DM on CSII, also with hypertension, hyperlipidemia, and vitamin D and vitamin B deficiencies. \par 1. T2DM- using Omnipod dash   not available for download today \par cont curent RX e basal rate  to 2.75 u/hr\par pt does not want to do carb counting - wants to do things the way he has bee n with bolusing with novolog pen \par \par -Continue SMBG. Call office with lows.\par -await BW from \par PMD \par \par 2. Hypertension- controlled.\par -Continue current regimen.\par \par 3. Hyperlipidemia- mild triglyceride elevation.\par -Continue statin.\par -Low fat diet.\par -Repeat lipids in 3 months.\par \par 4. Vitamin D deficiency\par -Cont Vitamin D 50K per week - need updated labs\par -Repeat level in 3 months.\par \par \par 5 Dyspnea due to COvid- will follwoupwith pulm \par \par 6. INcCreatinin- pt recalls beruchi told his  kideny s were off in hospital last year [facility]- thinks he saw Dr Aparicio and Dr Daley - give inc Creatinein- will make followup appt with tehir office \par \par \par Glucose Sensor Necessity:  This patient with diabetes performs 4 or more glucose checks per day utilizing a home blood glucose monitor.  The patient is treated with insulin via 3 or more injections daily plus a subcutaneous insulin infusion pump.  This patient requires frequent adjustments to their insulin treatment on the basis of therapeutic continuous glucose monitoring results.  In addition, the patient has been to our office for an evaluation of their diabetes control within the past 6 months.  \par \par

## 2021-04-13 NOTE — PHYSICAL EXAM
[Alert] : alert [Well Nourished] : well nourished [No Acute Distress] : no acute distress [Well Developed] : well developed [Normal Sclera/Conjunctiva] : normal sclera/conjunctiva [EOMI] : extra ocular movement intact [No Proptosis] : no proptosis [Normal Oropharynx] : the oropharynx was normal [Thyroid Not Enlarged] : the thyroid was not enlarged [No Thyroid Nodules] : no palpable thyroid nodules [No Respiratory Distress] : no respiratory distress [No Accessory Muscle Use] : no accessory muscle use [Clear to Auscultation] : lungs were clear to auscultation bilaterally [Normal S1, S2] : normal S1 and S2 [Normal Rate] : heart rate was normal [Regular Rhythm] : with a regular rhythm [No Edema] : no peripheral edema [Pedal Pulses Normal] : the pedal pulses are present [Normal Anterior Cervical Nodes] : no anterior cervical lymphadenopathy [No Stigmata of Cushings Syndrome] : no stigmata of Cushings Syndrome [Normal Gait] : normal gait [Normal Strength/Tone] : muscle strength and tone were normal [No Rash] : no rash [Normal] : normal [Full ROM] : with full range of motion [Normal Reflexes] : deep tendon reflexes were 2+ and symmetric [No Tremors] : no tremors [Oriented x3] : oriented to person, place, and time [Acanthosis Nigricans] : no acanthosis nigricans [Diminished Throughout Both Feet] : normal tactile sensation with monofilament testing throughout both feet [de-identified] : legft neck muscles tense

## 2021-04-13 NOTE — HISTORY OF PRESENT ILLNESS
[FreeTextEntry1] : Type: 2\par Severity: moderate\par Duration: years\par Associated symptoms: neuropathy\par Modifying factors:  pt  does not carb count\par HAd COvid [facility] 2020\par \par Omnipod with Novolog, changes infusion site every 2-3 days\par using Omnipod pump for basal rate-\par takes Novolog pens for injection for mealtime boluses - takign 18 untis tid ac  if has full cup of ricwe and  bread \par \par was on steroids in Feb for sinus infeciton - BS ran higher \par \par has been on prednsiosne taper for past 5 days from COVID from [facility] \par \par  SMBG has not been using Dexcom - had reaction to adhesive - very itchy \par \par Last eye exam: July 2019 -, mild background DM retinopathy \par Last foot exam:July 2019 , history of neuropathy\par Diet: carb counting\par Weight: stable\par Exercise: linmited by dyspnea \par \par \par has been feeling ok overall  been trying to rotate  iinfusion sets

## 2021-06-29 ENCOUNTER — RX RENEWAL (OUTPATIENT)
Age: 76
End: 2021-06-29

## 2021-07-16 ENCOUNTER — RX RENEWAL (OUTPATIENT)
Age: 76
End: 2021-07-16

## 2021-07-16 RX ORDER — PEN NEEDLE, DIABETIC 29 G X1/2"
31G X 5 MM NEEDLE, DISPOSABLE MISCELLANEOUS
Qty: 400 | Refills: 1 | Status: ACTIVE | COMMUNITY
Start: 2019-01-28 | End: 1900-01-01

## 2021-09-23 ENCOUNTER — INPATIENT (INPATIENT)
Facility: HOSPITAL | Age: 76
LOS: 1 days | Discharge: HOME | End: 2021-09-25
Admitting: FAMILY MEDICINE
Payer: MEDICARE

## 2021-09-23 PROCEDURE — 71045 X-RAY EXAM CHEST 1 VIEW: CPT | Mod: 26

## 2021-09-23 PROCEDURE — 99285 EMERGENCY DEPT VISIT HI MDM: CPT | Mod: CS

## 2021-09-24 PROCEDURE — 71250 CT THORAX DX C-: CPT | Mod: 26

## 2021-09-27 ENCOUNTER — APPOINTMENT (OUTPATIENT)
Dept: CARE COORDINATION | Facility: HOME HEALTH | Age: 76
End: 2021-09-27
Payer: MEDICARE

## 2021-09-27 VITALS
HEART RATE: 61 BPM | SYSTOLIC BLOOD PRESSURE: 120 MMHG | DIASTOLIC BLOOD PRESSURE: 70 MMHG | OXYGEN SATURATION: 97 % | TEMPERATURE: 97.2 F | RESPIRATION RATE: 15 BRPM

## 2021-09-27 DIAGNOSIS — J18.9 PNEUMONIA, UNSPECIFIED ORGANISM: ICD-10-CM

## 2021-09-27 DIAGNOSIS — K13.79 OTHER LESIONS OF ORAL MUCOSA: ICD-10-CM

## 2021-09-27 PROCEDURE — 99495 TRANSJ CARE MGMT MOD F2F 14D: CPT

## 2021-09-27 RX ORDER — PREDNISONE 10 MG/1
10 TABLET ORAL
Qty: 30 | Refills: 0 | Status: DISCONTINUED | COMMUNITY
Start: 2020-12-02 | End: 2021-09-27

## 2021-09-27 RX ORDER — MUPIROCIN 20 MG/G
2 OINTMENT TOPICAL
Qty: 22 | Refills: 0 | Status: COMPLETED | COMMUNITY
Start: 2019-02-05 | End: 2021-09-27

## 2021-09-27 RX ORDER — CEFDINIR 300 MG/1
300 CAPSULE ORAL
Refills: 0 | Status: ACTIVE | COMMUNITY

## 2021-09-27 RX ORDER — FLUTICASONE PROPIONATE AND SALMETEROL XINAFOATE 115; 21 UG/1; UG/1
115-21 AEROSOL, METERED RESPIRATORY (INHALATION)
Qty: 12 | Refills: 0 | Status: DISCONTINUED | COMMUNITY
Start: 2020-12-02 | End: 2021-09-27

## 2021-09-27 RX ORDER — GUAIFENESIN 600 MG/1
600 TABLET, EXTENDED RELEASE ORAL
Refills: 0 | Status: ACTIVE | COMMUNITY

## 2021-09-27 RX ORDER — DONEPEZIL HYDROCHLORIDE 5 MG/1
5 TABLET ORAL
Qty: 90 | Refills: 0 | Status: DISCONTINUED | COMMUNITY
Start: 2020-12-15 | End: 2021-09-27

## 2021-09-27 RX ORDER — DOXYCYCLINE HYCLATE 100 MG/1
100 TABLET ORAL
Qty: 14 | Refills: 0 | Status: COMPLETED | COMMUNITY
Start: 2021-03-06 | End: 2021-09-27

## 2021-09-27 NOTE — PHYSICAL EXAM
[No Acute Distress] : no acute distress [Well Nourished] : well nourished [Well Developed] : well developed [Normal Oropharynx] : the oropharynx was normal [No Respiratory Distress] : no respiratory distress  [Clear to Auscultation] : lungs were clear to auscultation bilaterally [No Accessory Muscle Use] : no accessory muscle use [Normal Rate] : normal rate  [Regular Rhythm] : with a regular rhythm [Normal S1, S2] : normal S1 and S2 [No Murmur] : no murmur heard [Pedal Pulses Present] : the pedal pulses are present [No Edema] : there was no peripheral edema [Soft] : abdomen soft [Non-distended] : non-distended [Normal Bowel Sounds] : normal bowel sounds [No Joint Swelling] : no joint swelling [Grossly Normal Strength/Tone] : grossly normal strength/tone [No Rash] : no rash [No Skin Lesions] : no skin lesions [Normal Gait] : normal gait [Coordination Grossly Intact] : coordination grossly intact [Normal Affect] : the affect was normal [Alert and Oriented x3] : oriented to person, place, and time [Normal Mood] : the mood was normal [de-identified] : approx 6mm area of redness mid hard palate. No ulceration or surrounding tissue necrosis noted.

## 2021-09-27 NOTE — COUNSELING
[Fall prevention counseling provided] : Fall prevention counseling provided [Adequate lighting] : Adequate lighting [No throw rugs] : No throw rugs [Use proper foot wear] : Use proper foot wear [None] : None [Good understanding] : Patient has a good understanding of lifestyle changes and steps needed to achieve self management goal

## 2021-09-27 NOTE — ASSESSMENT
[FreeTextEntry1] : Nba Sparks is being seen after discharge home from [facility]. He was admitted on 9/23/21 for PNA and discharged home on 9/25/21.  Discharge medications were reviewed and reconciled with the current medication list and medications in home.\par \par 1)PNA-resolving\par No worsening symptoms, no cough, SOB,wheezing. Pt reports fatigue, but able to perform ADL's.\par Finish Cefdinir BID\par Mucinex q12hr for congestion\par [facility] TCM  teaching: Advised patient to adhere to all medications including demonstrating proper use of inhalers and nebulizers. Encourage the use of proper breathing techniques such as pursed lip breathing or leaning forward during exhalation.Increase activity as tolerated and maintain optimal activity levels. Continue coughing and deep breathing exercises including use of Incentive Spirometry. Receive routine pneumococcal and influenza vaccinations. Identify early signs and sx of disease and notify NP/MD. Maintain proper nutrition and hydration. Follow up with Pulmonologist within 7 days of discharge. Contact information given, patient advised to call with any questions/concerns. \par \par 2)Mouth sores-c/o inability to eat due to sore and pain in roof of mouth\par 6mm red area in middle of hard palate-no ulceration or surrounding skin irritation\par Pt using salt water rinse.\par Use Anbesol 3-4times a day with a q-tip\par Cont warm salt water rinse after meals\par Hydrate well, call for any worsening symptoms or increase in mouth soreness\par \par 3) Diabetes-HgbA1c-8.5\par Pt using freestyle Moise, insulin pump.\par Am BG-134\par Enc low carbohydrate lifestyle\par f/u w/endocrinologist\par \par Reminded of TCM program and encouraged pt to call with any questions or concerns and especially with worsening of symptoms. Verbalized understanding.\par \par \par \par

## 2021-09-27 NOTE — REVIEW OF SYSTEMS
[Fever] : no fever [Chills] : no chills [Fatigue] : fatigue [Negative] : Psychiatric [FreeTextEntry4] : Soreness reported in mouth making it difficult to eat

## 2021-09-27 NOTE — HISTORY OF PRESENT ILLNESS
[Post-hospitalization from ___ Hospital] : Post-hospitalization from [unfilled] Hospital [Admitted on: ___] : The patient was admitted on [unfilled] [Discharged on ___] : discharged on [unfilled] [Discharge Summary] : discharge summary [Pertinent Labs] : pertinent labs [Radiology Findings] : radiology findings [Discharge Med List] : discharge medication list [Med Reconciliation] : medication reconciliation has been completed [Patient Contacted By: ____] : and contacted by [unfilled] [FreeTextEntry2] : 74 y/o male admitted to [facility] for SOB, fever, chills. CXR done at Urgent Care showed pna. Pt sent to ED where Chest CT was done, p/ox 88% on r/a. Pt on oxygen at 5LNC, results of Ct-peripheral GGO-underlying chronic interstitial lung disease. Tx with IV antibiotics, mucolytics, antipyretics. WBC trending down to 9.6, Hgb A1c-8/5. Procal elevated. \par Pt stabilized and d/c home.\par Pt seen today for transitional care management in his home. He denies any SOB, cough, fever, chills. He is having fatigue and c/o "sore" in his mouth making it difficult to eat. He does not routinely use inhalers and hasn't needed his albuterol inhaler. He is rinsing his mouth with warm salt water and reports it is a little better than yesterday.\par

## 2021-09-27 NOTE — CURRENT MEDS
[Takes medication as prescribed] : takes [None] : Patient does not have any barriers to medication adherence [Yes] : Reviewed medication list for presence of high-risk medications. [Sedatives] : sedatives [FreeTextEntry1] : Advised pt to take Seroquel 25mg a little earlier in the evening to avoid daytime fatigue

## 2021-10-05 ENCOUNTER — APPOINTMENT (OUTPATIENT)
Dept: ENDOCRINOLOGY | Facility: CLINIC | Age: 76
End: 2021-10-05
Payer: MEDICARE

## 2021-10-05 PROCEDURE — 95251 CONT GLUC MNTR ANALYSIS I&R: CPT

## 2021-10-13 NOTE — HISTORY OF PRESENT ILLNESS
[FreeTextEntry1] : start time \par 225 pm \par  end 238 PM \par \par Type: 2\par Severity: moderate\par Duration: years\par Associated symptoms: neuropathy\par Modifying factors:  pt  does not carb count\par HAd COvid MArch 2020\par Hospitalized with PNA last week \par  ALso had GB removed over the summer \par \par \par \par Last eye exam: July 2019 -, mild background DM retinopathy \par Last foot exam:July 2019 , history of neuropathy\par Diet: carb counting\par Weight: stable\par Exercise: linmited by dyspnea \par \par \par has been feeling ok overall  been trying to rotate  iinfusion sets

## 2021-10-13 NOTE — REASON FOR VISIT
[Follow - Up] : a follow-up visit [Home] : at home, [unfilled] , at the time of the visit. [Other Location: e.g. Home (Enter Location, City,State)___] : at [unfilled] [Verbal consent obtained from patient] : the patient, [unfilled] [Pump Education] : a pump education visit [Follow-Up: _____] : a [unfilled] follow-up visit

## 2021-10-13 NOTE — REVIEW OF SYSTEMS
[SOB on Exertion] : shortness of breath during exertion [Pain/Numbness of Digits] : pain/numbness of digits [Polydipsia] : polydipsia [As Noted in HPI] : as noted in HPI [Shortness Of Breath] : shortness of breath [Recent Weight Gain (___ Lbs)] : no recent weight gain [Chest Pain] : no chest pain [Palpitations] : no palpitations [Nausea] : no nausea [Vomiting] : no vomiting was observed [Polyuria] : no polyuria [Joint Pain] : no joint pain [FreeTextEntry3] : no changes in vision

## 2021-10-13 NOTE — ASSESSMENT
[FreeTextEntry1] : 74 year old male with T2DM on CSII, also with hypertension, hyperlipidemia, and vitamin D and vitamin B deficiencies. \par 1. T2DM- using Omnipod dash   not available for download today \par cont curent RX e basal rate  to 2.75 u/hr- may need to increase but pt not comfortabel making chnages \par pt does not want to do carb counting - w  see RD CDE in 1 week \par still Bolusing via insulin Pen   takes 12 untis with meals - can increase to 14 untis with meals as BS runnign a bit higher \par \par -Continue SMBG. Call office with lows.\par -await BW from \par PMD \par Moise revdiwed \par 27% very high \par 31% high \par 41% target \par low 1% \par very low 0% \par avg glucose 206 \par variablikty 40.4% \par \par \par 2. Hypertension- controlled.\par -Continue current regimen.\par \par 3. Hyperlipidemia- mild triglyceride elevation.\par -Continue statin.\par -Low fat diet.\par -Repeat lipids in 3 months.\par \par 4. Vitamin D deficiency\par -Cont Vitamin D 50K per week - need updated labs\par -Repeat level in 3 months.\par \par \par 5 Dyspnea due to COvid- will follwoupwith pulm and also for recenty PNA \par \par 6. INcCreatinin- willsee renal \par \par \par Glucose Sensor Necessity:  This patient with diabetes performs 4 or more glucose checks per day utilizing a home blood glucose monitor.  The patient is treated with insulin via 3 or more injections daily plus a subcutaneous insulin infusion pump.  This patient requires frequent adjustments to their insulin treatment on the basis of therapeutic continuous glucose monitoring results.  In addition, the patient has been to our office for an evaluation of their diabetes control within the past 6 months.  \par \par

## 2021-10-13 NOTE — PHYSICAL EXAM
[Alert] : alert [Well Nourished] : well nourished [No Acute Distress] : no acute distress [Well Developed] : well developed [Normal Sclera/Conjunctiva] : normal sclera/conjunctiva [EOMI] : extra ocular movement intact [No Proptosis] : no proptosis [Normal Oropharynx] : the oropharynx was normal [Thyroid Not Enlarged] : the thyroid was not enlarged [No Thyroid Nodules] : no palpable thyroid nodules [No Respiratory Distress] : no respiratory distress [No Accessory Muscle Use] : no accessory muscle use [Clear to Auscultation] : lungs were clear to auscultation bilaterally [Normal S1, S2] : normal S1 and S2 [Normal Rate] : heart rate was normal [Regular Rhythm] : with a regular rhythm [No Edema] : no peripheral edema [Pedal Pulses Normal] : the pedal pulses are present [Normal Anterior Cervical Nodes] : no anterior cervical lymphadenopathy [No Stigmata of Cushings Syndrome] : no stigmata of Cushings Syndrome [Normal Gait] : normal gait [Normal Strength/Tone] : muscle strength and tone were normal [No Rash] : no rash [Acanthosis Nigricans] : no acanthosis nigricans [Normal] : normal [Full ROM] : with full range of motion [Diminished Throughout Both Feet] : normal tactile sensation with monofilament testing throughout both feet [Normal Reflexes] : deep tendon reflexes were 2+ and symmetric [No Tremors] : no tremors [Oriented x3] : oriented to person, place, and time [de-identified] : legft neck muscles tense

## 2021-10-29 ENCOUNTER — RX RENEWAL (OUTPATIENT)
Age: 76
End: 2021-10-29

## 2021-12-08 ENCOUNTER — RX RENEWAL (OUTPATIENT)
Age: 76
End: 2021-12-08

## 2021-12-08 RX ORDER — INSULIN ASPART 100 [IU]/ML
100 INJECTION, SOLUTION INTRAVENOUS; SUBCUTANEOUS
Qty: 30 | Refills: 1 | Status: ACTIVE | COMMUNITY
Start: 2021-12-08 | End: 1900-01-01

## 2021-12-21 ENCOUNTER — APPOINTMENT (OUTPATIENT)
Dept: ENDOCRINOLOGY | Facility: CLINIC | Age: 76
End: 2021-12-21

## 2021-12-29 ENCOUNTER — APPOINTMENT (OUTPATIENT)
Dept: ENDOCRINOLOGY | Facility: CLINIC | Age: 76
End: 2021-12-29
Payer: MEDICARE

## 2021-12-29 DIAGNOSIS — E11.65 TYPE 2 DIABETES MELLITUS WITH DIABETIC AUTONOMIC (POLY)NEUROPATHY: ICD-10-CM

## 2021-12-29 DIAGNOSIS — E11.43 TYPE 2 DIABETES MELLITUS WITH DIABETIC AUTONOMIC (POLY)NEUROPATHY: ICD-10-CM

## 2021-12-29 PROCEDURE — 99214 OFFICE O/P EST MOD 30 MIN: CPT | Mod: 95

## 2021-12-29 NOTE — HISTORY OF PRESENT ILLNESS
[Home] : at home, [unfilled] , at the time of the visit. [Medical Office: ([facility])___] : at the medical office located in  [Verbal consent obtained from patient] : the patient, [unfilled] [FreeTextEntry1] : Pt. reports he has the flu and vomited yesterday. Feeling a little better today. \par \par Type: 2\par Severity: moderate\par Duration: years\par Associated symptoms: neuropathy\par Modifying factors:  pt  does not carb count\par Had Covid MArch 2020\par Hospitalized with PNA last week \par  ALso had GB removed over the summer \par \par Current Medication Regimen: \par Insulin Aspart via Omnipod insulin pump \par \par \par Self Blood sugar monitoring: Moise - unable to download \par Pt. reports high blood sugars for the past week average 200-275 \par \par \par Last eye exam: July 2021 -, mild background DM retinopathy \par Last foot exam:July 2019 , history of neuropathy\par Diet: carb counting\par Weight: stable\par Exercise: limited by dyspnea \par \par Has been better at  rotating  infusion sites

## 2021-12-29 NOTE — REVIEW OF SYSTEMS
[Fatigue] : no fatigue [Decreased Appetite] : decreased appetite [Recent Weight Gain (___ Lbs)] : no recent weight gain [Recent Weight Loss (___ Lbs)] : no recent weight loss [Visual Field Defect] : no visual field defect [Blurred Vision] : blurred vision [Dysphagia] : no dysphagia [Neck Pain] : no neck pain [Dysphonia] : no dysphonia [Chest Pain] : no chest pain [Palpitations] : no palpitations [Constipation] : no constipation [Diarrhea] : no diarrhea [Polyuria] : no polyuria [Dysuria] : no dysuria [Headaches] : no headaches [Tremors] : no tremors [Depression] : no depression [Anxiety] : no anxiety [Polydipsia] : no polydipsia [FreeTextEntry2] : weight stable  [FreeTextEntry3] : with watching TV  [FreeTextEntry4] : tooth pain sometimes  [de-identified] : dry mouth

## 2021-12-29 NOTE — ASSESSMENT
[FreeTextEntry1] : 76 year old male with T2DM on CSII, also with hypertension, hyperlipidemia, and vitamin D and vitamin B deficiencies. \par \par 1. T2DM- using Omnipod dash not available for download today \par cont curent RX e basal rate to 2.75 u/hr- may need to increase but pt not comfortable making changes \par pt does not want to do carb counting - w see RD CDE in 1 week \par still Bolusing via insulin Pen takes 12 units with meals - can increase to 14 untis with meals as BS runnign a bit higher \par - Check A1C now \par -Continue SMBG. Call office with lows. Will download Moise. \par \par 2. Hypertension- controlled.\par -Continue current regimen.\par \par 3. Hyperlipidemia- mild triglyceride elevation.\par -Continue statin.\par -Low fat diet.\par -Check lipids now\par \par 4. Vitamin D deficiency\par -Cont Vitamin D 50K per week - need updated labs\par -Check levels now\par \par \par Glucose Sensor Necessity: This patient with diabetes performs 4 or more glucose checks per day utilizing a home blood glucose monitor. The patient is treated with insulin via 3 or more injections daily plus a subcutaneous insulin infusion pump. This patient requires frequent adjustments to their insulin treatment on the basis of therapeutic continuous glucose monitoring results. In addition, the patient has been to our office for an evaluation of their diabetes control within the past 6 months. \par \par Follow up visit in 3 months. \par \par Overall spent 30 minutes speaking with patient and documenting.

## 2022-01-17 ENCOUNTER — RX RENEWAL (OUTPATIENT)
Age: 77
End: 2022-01-17

## 2022-04-05 ENCOUNTER — APPOINTMENT (OUTPATIENT)
Dept: ENDOCRINOLOGY | Facility: CLINIC | Age: 77
End: 2022-04-05
Payer: MEDICARE

## 2022-04-05 VITALS
WEIGHT: 229 LBS | SYSTOLIC BLOOD PRESSURE: 116 MMHG | OXYGEN SATURATION: 96 % | BODY MASS INDEX: 31.02 KG/M2 | HEART RATE: 64 BPM | DIASTOLIC BLOOD PRESSURE: 72 MMHG | HEIGHT: 72 IN

## 2022-04-05 LAB — GLUCOSE BLDC GLUCOMTR-MCNC: 65

## 2022-04-05 PROCEDURE — 82962 GLUCOSE BLOOD TEST: CPT

## 2022-04-05 PROCEDURE — 95251 CONT GLUC MNTR ANALYSIS I&R: CPT

## 2022-04-05 PROCEDURE — 99214 OFFICE O/P EST MOD 30 MIN: CPT | Mod: 25

## 2022-04-11 ENCOUNTER — APPOINTMENT (OUTPATIENT)
Dept: ENDOCRINOLOGY | Facility: CLINIC | Age: 77
End: 2022-04-11
Payer: MEDICARE

## 2022-04-11 PROCEDURE — 36415 COLL VENOUS BLD VENIPUNCTURE: CPT

## 2022-04-13 LAB
25(OH)D3 SERPL-MCNC: 54 NG/ML
ALBUMIN SERPL ELPH-MCNC: 4.4 G/DL
ALP BLD-CCNC: 99 U/L
ALT SERPL-CCNC: 14 U/L
ANION GAP SERPL CALC-SCNC: 13 MMOL/L
APPEARANCE: CLEAR
AST SERPL-CCNC: 18 U/L
BASOPHILS # BLD AUTO: 0.11 K/UL
BASOPHILS NFR BLD AUTO: 1.1 %
BILIRUB SERPL-MCNC: 0.3 MG/DL
BILIRUBIN URINE: NEGATIVE
BLOOD URINE: NEGATIVE
BUN SERPL-MCNC: 17 MG/DL
CALCIUM SERPL-MCNC: 9.4 MG/DL
CHLORIDE SERPL-SCNC: 107 MMOL/L
CHOLEST SERPL-MCNC: 110 MG/DL
CO2 SERPL-SCNC: 23 MMOL/L
COLOR: NORMAL
CREAT SERPL-MCNC: 1.47 MG/DL
CREAT SPEC-SCNC: 139 MG/DL
EGFR: 49 ML/MIN/1.73M2
EOSINOPHIL # BLD AUTO: 0.33 K/UL
EOSINOPHIL NFR BLD AUTO: 3.2 %
ESTIMATED AVERAGE GLUCOSE: 194 MG/DL
GLUCOSE QUALITATIVE U: ABNORMAL
GLUCOSE SERPL-MCNC: 99 MG/DL
HBA1C MFR BLD HPLC: 8.4 %
HCT VFR BLD CALC: 42.4 %
HDLC SERPL-MCNC: 33 MG/DL
HGB BLD-MCNC: 13.3 G/DL
IMM GRANULOCYTES NFR BLD AUTO: 0.7 %
KETONES URINE: NEGATIVE
LDLC SERPL CALC-MCNC: 47 MG/DL
LEUKOCYTE ESTERASE URINE: NEGATIVE
LYMPHOCYTES # BLD AUTO: 3.12 K/UL
LYMPHOCYTES NFR BLD AUTO: 30.3 %
MAGNESIUM SERPL-MCNC: 2 MG/DL
MAN DIFF?: NORMAL
MCHC RBC-ENTMCNC: 30.4 PG
MCHC RBC-ENTMCNC: 31.4 GM/DL
MCV RBC AUTO: 97 FL
MICROALBUMIN 24H UR DL<=1MG/L-MCNC: 2.8 MG/DL
MICROALBUMIN/CREAT 24H UR-RTO: 20 MG/G
MONOCYTES # BLD AUTO: 0.87 K/UL
MONOCYTES NFR BLD AUTO: 8.4 %
NEUTROPHILS # BLD AUTO: 5.81 K/UL
NEUTROPHILS NFR BLD AUTO: 56.3 %
NITRITE URINE: NEGATIVE
NONHDLC SERPL-MCNC: 77 MG/DL
PH URINE: 5.5
PHOSPHATE SERPL-MCNC: 3.6 MG/DL
PLATELET # BLD AUTO: 184 K/UL
POTASSIUM SERPL-SCNC: 4.3 MMOL/L
PROT SERPL-MCNC: 7.4 G/DL
PROTEIN URINE: NORMAL
RBC # BLD: 4.37 M/UL
RBC # FLD: 14.1 %
SODIUM SERPL-SCNC: 143 MMOL/L
SPECIFIC GRAVITY URINE: 1.02
T3FREE SERPL-MCNC: 3.33 PG/ML
T4 FREE SERPL-MCNC: 1 NG/DL
TRIGL SERPL-MCNC: 148 MG/DL
TSH SERPL-ACNC: 2 UIU/ML
UROBILINOGEN URINE: NORMAL
VIT B12 SERPL-MCNC: 382 PG/ML
WBC # FLD AUTO: 10.31 K/UL

## 2022-04-21 ENCOUNTER — APPOINTMENT (OUTPATIENT)
Dept: ENDOCRINOLOGY | Facility: CLINIC | Age: 77
End: 2022-04-21
Payer: MEDICARE

## 2022-04-21 PROCEDURE — G0108 DIAB MANAGE TRN  PER INDIV: CPT

## 2022-04-28 NOTE — HISTORY OF PRESENT ILLNESS
[Home] : at home, [unfilled] , at the time of the visit. [Medical Office: ([facility])___] : at the medical office located in  [Verbal consent obtained from patient] : the patient, [unfilled] [FreeTextEntry1] : pt here for followup T2DM\par  has not been doing as well with his sugars  some mor low BS lately \par Type: 2\par Severity: moderate\par Duration: years\par Associated symptoms: neuropathy\par Modifying factors:  pt  does not carb count\par Had Covid MArch 2020\par \par s/p cholecystecomty \par \par Current Medication Regimen: \par Insulin Aspart via Omnipod insulin pump \par \par \par Self Blood sugar monitoring: Moise -\par Avg   \par GMI 6.3% \par Target 63% \par high 17% \par very high 9% \par low 9% very  low 2% \par Pt. reports high blood sugars for the past week average 200-275 \par \par \par Last eye exam: July 2021 -, mild background DM retinopathy \par Last foot exam:July 2019 , history of neuropathy\par Diet: carb counting\par Weight: stable\par Exercise: limited by dyspnea \par \par Has been better at  rotating  infusion sites

## 2022-04-28 NOTE — ASSESSMENT
[FreeTextEntry1] : 76 year old male with T2DM on CSII, also with hypertension, hyperlipidemia, and vitamin D and vitamin B deficiencies. \par \par 1. T2DM- \par wilr educe basal rate 2.75>>2.5 \par \par pt does not want to do carb counting - w see RD CDE in 1 week \par still Bolusing via insulin Pen takes14 units with meals \par \par - Check A1C now \par -Continue SMBG. Call office with lows.\par \par 2. Hypertension- controlled.\par -Continue current regimen.\par \par 3. Hyperlipidemia- mild triglyceride elevation.\par -Continue statin.\par -Low fat diet.\par \par \par 4. Vitamin D deficiency\par -Cont Vitamin D 50K per week - \par \par \par \par Glucose Sensor Necessity: This patient with diabetes performs 4 or more glucose checks per day utilizing a home blood glucose monitor. The patient is treated with insulin via 3 or more injections daily plus a subcutaneous insulin infusion pump. This patient requires frequent adjustments to their insulin treatment on the basis of therapeutic continuous glucose monitoring results. In addition, the patient has been to our office for an evaluation of their diabetes control within the past 6 months. \par \par Follow up visit in 3 months. \par \par

## 2022-04-28 NOTE — PHYSICAL EXAM
[Alert] : alert [Well Nourished] : well nourished [No Acute Distress] : no acute distress [Well Developed] : well developed [Normal Sclera/Conjunctiva] : normal sclera/conjunctiva [EOMI] : extra ocular movement intact [No Proptosis] : no proptosis [Normal Oropharynx] : the oropharynx was normal [Thyroid Not Enlarged] : the thyroid was not enlarged [No Thyroid Nodules] : no palpable thyroid nodules [No Respiratory Distress] : no respiratory distress [No Accessory Muscle Use] : no accessory muscle use [Clear to Auscultation] : lungs were clear to auscultation bilaterally [Normal S1, S2] : normal S1 and S2 [Normal Rate] : heart rate was normal [Regular Rhythm] : with a regular rhythm [No Edema] : no peripheral edema [Pedal Pulses Normal] : the pedal pulses are present [Normal Anterior Cervical Nodes] : no anterior cervical lymphadenopathy [Normal Posterior Cervical Nodes] : no posterior cervical lymphadenopathy [No Stigmata of Cushings Syndrome] : no stigmata of Cushings Syndrome [Normal Gait] : normal gait [Normal Strength/Tone] : muscle strength and tone were normal [No Rash] : no rash [Acanthosis Nigricans] : no acanthosis nigricans [Normal] : normal [Full ROM] : with full range of motion [Diminished Throughout Both Feet] : normal tactile sensation with monofilament testing throughout both feet [Normal Reflexes] : deep tendon reflexes were 2+ and symmetric [No Tremors] : no tremors [Oriented x3] : oriented to person, place, and time

## 2022-04-28 NOTE — REVIEW OF SYSTEMS
[Decreased Appetite] : decreased appetite [Blurred Vision] : blurred vision [Fatigue] : no fatigue [Recent Weight Gain (___ Lbs)] : no recent weight gain [Recent Weight Loss (___ Lbs)] : no recent weight loss [Visual Field Defect] : no visual field defect [Dysphagia] : no dysphagia [Neck Pain] : no neck pain [Dysphonia] : no dysphonia [Chest Pain] : no chest pain [Palpitations] : no palpitations [Diarrhea] : no diarrhea [Constipation] : no constipation [Polyuria] : no polyuria [Dysuria] : no dysuria [Headaches] : no headaches [Tremors] : no tremors [Depression] : no depression [Anxiety] : no anxiety [Polydipsia] : no polydipsia [FreeTextEntry2] : weight stable  [FreeTextEntry3] : with watching TV  [FreeTextEntry4] : tooth pain sometimes  [de-identified] : dry mouth

## 2022-05-25 ENCOUNTER — APPOINTMENT (OUTPATIENT)
Dept: ENDOCRINOLOGY | Facility: CLINIC | Age: 77
End: 2022-05-25

## 2022-08-29 ENCOUNTER — APPOINTMENT (OUTPATIENT)
Dept: ENDOCRINOLOGY | Facility: CLINIC | Age: 77
End: 2022-08-29

## 2022-08-29 VITALS
HEART RATE: 85 BPM | HEIGHT: 72 IN | OXYGEN SATURATION: 97 % | DIASTOLIC BLOOD PRESSURE: 72 MMHG | WEIGHT: 230 LBS | BODY MASS INDEX: 31.15 KG/M2 | SYSTOLIC BLOOD PRESSURE: 124 MMHG

## 2022-08-29 LAB
GLUCOSE BLDC GLUCOMTR-MCNC: 154
HBA1C MFR BLD HPLC: 8.1

## 2022-08-29 PROCEDURE — 95251 CONT GLUC MNTR ANALYSIS I&R: CPT

## 2022-08-29 PROCEDURE — 83036 HEMOGLOBIN GLYCOSYLATED A1C: CPT | Mod: QW

## 2022-08-29 PROCEDURE — 82962 GLUCOSE BLOOD TEST: CPT

## 2022-08-29 PROCEDURE — 99215 OFFICE O/P EST HI 40 MIN: CPT | Mod: 25

## 2022-08-29 NOTE — REVIEW OF SYSTEMS
[Decreased Appetite] : decreased appetite [Blurred Vision] : blurred vision [Fatigue] : no fatigue [Recent Weight Gain (___ Lbs)] : no recent weight gain [Recent Weight Loss (___ Lbs)] : no recent weight loss [Visual Field Defect] : no visual field defect [Dysphagia] : no dysphagia [Neck Pain] : no neck pain [Dysphonia] : no dysphonia [Chest Pain] : no chest pain [Palpitations] : no palpitations [Constipation] : no constipation [Diarrhea] : no diarrhea [Polyuria] : no polyuria [Dysuria] : no dysuria [Headaches] : no headaches [Tremors] : no tremors [Depression] : no depression [Anxiety] : no anxiety [Polydipsia] : no polydipsia [FreeTextEntry2] : weight stable  [FreeTextEntry3] : with watching TV  [FreeTextEntry4] : tooth pain sometimes  [de-identified] : dry mouth

## 2022-08-29 NOTE — PHYSICAL EXAM
[Alert] : alert [Well Nourished] : well nourished [No Acute Distress] : no acute distress [Well Developed] : well developed [Normal Sclera/Conjunctiva] : normal sclera/conjunctiva [EOMI] : extra ocular movement intact [No Proptosis] : no proptosis [Normal Oropharynx] : the oropharynx was normal [Thyroid Not Enlarged] : the thyroid was not enlarged [No Thyroid Nodules] : no palpable thyroid nodules [No Respiratory Distress] : no respiratory distress [No Accessory Muscle Use] : no accessory muscle use [Clear to Auscultation] : lungs were clear to auscultation bilaterally [Normal S1, S2] : normal S1 and S2 [Normal Rate] : heart rate was normal [Regular Rhythm] : with a regular rhythm [No Edema] : no peripheral edema [Pedal Pulses Normal] : the pedal pulses are present [Normal Anterior Cervical Nodes] : no anterior cervical lymphadenopathy [No Stigmata of Cushings Syndrome] : no stigmata of Cushings Syndrome [Normal Gait] : normal gait [Normal Strength/Tone] : muscle strength and tone were normal [No Rash] : no rash [Normal] : normal [Full ROM] : with full range of motion [Normal Reflexes] : deep tendon reflexes were 2+ and symmetric [No Tremors] : no tremors [Oriented x3] : oriented to person, place, and time [Acanthosis Nigricans] : no acanthosis nigricans [Diminished Throughout Both Feet] : normal tactile sensation with monofilament testing throughout both feet

## 2022-08-29 NOTE — ASSESSMENT
Patient:   GENIE MENDOZA            MRN: [id number]            FIN: [id number]               Age:   69 years     Sex:  MALE     :  51   Associated Diagnoses:   None   Author:   CATHY ROCHA      History of Present Illness   69 year old male, , with a PMHx significant for M HTN, HLD and rosacea who presented to the ED with complaint of right sided flank pain.  Pt states that is was awaken out of his sleep at 0100 with sharp 8/10 flank pain that was nonradiating. Pain mildly relieved with diclofenac.  Pt endorsed associated urinary retention, incomplete emptying, straining, and hesitancy.  Denies any previous episodes of kidney stones, dysuria, hematuria, suprapubic pain, fevers, chills, N/V/D, SOB or chest pain.    In the ED, mildly hypertensive. Labs remarkable for creatinine 1.69 and WBC 13.7.  CT abd and pelvis noted Two partially obstructing kidney stones in the distal right ureter; Mild right hydronephrosis, right hydroureter, and perinephric fatty infiltration is noted. Severely distended lobulated urinary bladder may reflect chronic partial obstruction.  Prominent prostate gland.  Urology consulted and patient was transferred to the Obs unit for further management.       Review of Systems   Constitutional symptoms:  No fever, no chills, no sweats, no weakness, no fatigue.    Skin symptoms:  No jaundice, no rash, no pruritus.    Eye symptoms:  No recent vision problems,    ENMT symptoms:  No ear pain, no sore throat.    Respiratory symptoms:  No shortness of breath, no cough, no hemoptysis, no wheezing.    Cardiovascular symptoms:  No chest pain, no palpitations, no tachycardia, no syncope.    Gastrointestinal symptoms:  No abdominal pain, no nausea, no vomiting, no diarrhea, no constipation.    Genitourinary symptoms:  No dysuria,    Musculoskeletal symptoms:  No Muscle pain, no Joint pain.    Neurologic symptoms:  No headache, no dizziness.    Endocrine symptoms:  No polyuria,  [FreeTextEntry1] : 76 year old male with T2DM on CSII, also with hypertension, hyperlipidemia, and vitamin D and vitamin B deficiencies. \par \par 1. T2DM- \par wilr educe basal rate 2.5>>2.0 as pt  has been eatign less CHO \par meet with RD CDE \par  Consider new OmniPOd that integrates Dexcom\par  pt not bolusing via pump since diet change or not taking boluses vis SQ injeciotn either \par pt has Gvoke and   and aslo Levemir if needed \par \par \par  only took Novolog 1x SQ  saturday with BS higher  - was 291 \par meet \par \par - Check A1C now \par -Continue SMBG. Call office with lows.\par \par 2. Hypertension- controlled.\par -Continue current regimen.\par \par 3. Hyperlipidemia- mild triglyceride elevation.\par -Continue statin.\par -Low fat diet.\par \par \par 4. Vitamin D deficiency\par -Cont Vitamin D 50K per week - \par \par \par \par Glucose Sensor Necessity: This patient with diabetes performs 4 or more glucose checks per day utilizing a home blood glucose monitor. The patient is treated with insulin via 3 or more injections daily plus a subcutaneous insulin infusion pump. This patient requires frequent adjustments to their insulin treatment on the basis of therapeutic continuous glucose monitoring results. In addition, the patient has been to our office for an evaluation of their diabetes control within the past 6 months. \par \par Follow up visit in 3 months NP \par  6month Dr RANGEL\par  ASAP RD CDE jas \par \par  no polydipsia.           Histories   Past Med History:    All Problems  Hyperlipemia / [id number] / Confirmed  HTN (hypertension) / [id number] / Confirmed   Family History:    CA - Cancer  MOTHER  Stroke  FATHER     Procedure History:    Colonoscopy (354154599).  Hernia repair ([id number]).   Social History        Alcohol  Details: Use: None.  Exercise  Details: Excercise: Regularly.  Times Per Week: Daily.  Type: Walking.  Home/Environment  Details: Alcohol Abuse in Household: No.  Substance Abuse in Household: No.  Smoker in Household: No.  Patient Lives With: Spouse.  Living Situation: Lives With Spouse.  Ambulation: Independent.  Bathing: Independent.  Dressing: Independent.  Driving: Independent.  Eating: Independent.  Elimination: Independent.  Preparing Meal: Independent.  Taking Meds: Independent.  Toileting: Independent.  Transfers: Independent.  Substance Abuse  Details: Use: None.  Tobacco  Details: Smoked/Smokeless Tobacco Last 30 Days: No.  Use: Never smoker.  Cultural/Anabaptism Practices  Details: Anabaptism or Cultural Practices While in Hospital: No.  .        Health Status   Allergies: Allergies (ST)   Allergies (1) Active Reaction  No Known Medication Allergies None Documented     Current medications:  (Selected)   Documented Medications  Documented  Crestor 10 mg oral tablet: 10 mg, 1 tab, Oral, Q Bedtime  Mariola-C 1000 mg oral tablet: 1,000 mg, 1 tab, Oral, Daily  Freetext Home Medication: 2 tabs, Oral, BID  Minocin oral 50 mg capsule: 50 mg, 1 cap, Oral, Daily  Norco oral 325-5 mg tablet: 1 tab, Oral, Q4H, PRN: pain, 0 Refill(s)  Norco oral 325-5 mg tablet: 1 tab, Oral, Q6H, PRN: pain, 0 Refill(s)  Omega-3 1000 mg oral capsule: 1,000 mg, 1 cap, Oral, BID  Toprol-XL (succinate) oral 100 mg tablet: 100 mg, 1 tab, Oral, Daily  Vitamin D3 5000 intl units oral tablet: 5,000 unit, 1 tab, Oral, Daily, 100 tab  amitriptyline oral 25 mg tablet: 25 mg, 1 tab, Oral, Daily  aspirin: 81 mg, Oral, Daily  diclofenac  sodium oral 75 mg DR tablet (Voltaren): 75 mg, 1 tab, Oral, BID  multivitamin oral tablet: 1 tab, Oral, Daily  valsartan oral 160 mg tablet: 160 mg, 1 tab, Oral, Daily  vitamin E oral 400 unit capsule: 400 unit, 1 cap, Oral, Daily      Physical Examination   VS/Measurements        Vitals between:   23-JUL-2018 23:02:37   TO   24-JUL-2018 23:02:37                   LAST RESULT MINIMUM MAXIMUM  Temperature 36.9 36.5 37.0  Heart Rate 76 74 87  Respiratory Rate 16 16 20  NISBP           130 130 155  NIDBP           79 79 92  NIMBP           94 94 113  SpO2                    98 98 100       General:  Alert and oriented, No acute distress, Obese.    HENT:  Normocephalic, Normal hearing.    Respiratory:  Respirations are non-labored, Symmetrical chest wall expansion.    Cardiovascular:  Normal rate, Normal peripheral perfusion, No edema.    Gastrointestinal:  Soft, Non-tender, Non-distended.    Genitourinary:  No costovertebral angle tenderness, No scrotal tenderness, Testicles descended bilaterally. Uncircumcised. .    Musculoskeletal:  No swelling, No deformity.    Integumentary:  Warm, Dry.    Psychiatric:  Cooperative, Appropriate mood & affect.           Review / Management   Laboratory results:       Labs between:  23-JUL-2018 13:51 to 24-JUL-2018 13:51    CBC:                 WBC  HgB  Hct  Plt  MCV  RDW   24-JUL-2018 (H) 13.7  13.7  41.1  199  86.0  14.4     DIFF:                 Seg  Neutroph//ABS  Lymph//ABS  Mono//ABS  EOS/ABS   24-JUL-2018 NOT APPLICABLE  86 // (H) 11.6  8 // 1.1 6 // 0.9 0 // 0.1    BMP:                 Na  Cl  BUN  Glu   24-JUL-2018 137  104  (H) 26  (H) 103                              K  CO2  Cr  Ca                              5.1  26  (H) 1.69  8.9     CMP:                 AST  ALT  AlkPhos  Bili  Albumin   24-JUL-2018 33  58  57  0.5  4.1                  .      Result title:  CT ABDOMEN AND PELVIS WO CON  Result status:  Final  Verified by:  LYN RIVERA on 07/24/2018  8:44  IMPRESSION:Two at least partially obstructing kidney stones in the distal right ureter measure up to 0.2 cm.  A 0.4 cm calcification is seen in the dependent portion of the urinary bladder.  Mild right hydronephrosis, right hydroureter, and perinephric fatty infiltration is noted.Severely distended lobulated urinary bladder may reflect chronic partial obstruction. Prominent prostate gland.  Recommend correlation with physical exam and PSA.Diffuse hepatic steatosis and borderline hepatomegaly. Probable simple cyst in the upper pole cortex of left kidney which may be confirmed with outpatient ultrasound.      Impression and Plan   69 year old male, , with a PMHx significant for HTN, HLD and rosacea who presented to the ED with complaint of right sided flank pain.     Right Flank pain  -2/2 nephrolithiasis  -pt afebrile with leukocytosis on admission  -s/p miller placement  -CT with evidence of nephrolithiasis and enlarged prostate  -UA unremarkable for infection  -Urology on consult  Plan  -start tamsulosin  -per Urology rec: possible ureteral stent placement, Q6 bladder scans  -NS 100cc/hr  -NPO after midnight  -Urn culture pending  -Tramadol for pain control    SHELLEY on CKD  -creat 1.69; previous admission 1.2  -2/2 nephrolithiasis  -will avoid nephrotoxins  -NS 100cc/hr  -will continue to monitor      FENA  -NS 100cc/hr  -electrolyte replete as needed  -Cardiac diet; NPO after midnight  -Ambulate      Ruba Bingham  PGY3                            Electronically Signed On 07/24/2018 23:17  __________________________________________________   RUBA ROCHA      Electronically Signed On 07/24/2018 23:19  __________________________________________________   RUBA ROCHA      Electronically Signed On 08/15/2018 16:51  __________________________________________________   CHRISTIANA MCWILLIAMS

## 2022-08-31 ENCOUNTER — APPOINTMENT (OUTPATIENT)
Dept: ENDOCRINOLOGY | Facility: CLINIC | Age: 77
End: 2022-08-31

## 2022-08-31 PROCEDURE — G0108 DIAB MANAGE TRN  PER INDIV: CPT

## 2022-11-25 ENCOUNTER — APPOINTMENT (OUTPATIENT)
Dept: ENDOCRINOLOGY | Facility: CLINIC | Age: 77
End: 2022-11-25

## 2022-12-10 ENCOUNTER — RESULT CHARGE (OUTPATIENT)
Age: 77
End: 2022-12-10

## 2022-12-10 ENCOUNTER — APPOINTMENT (OUTPATIENT)
Dept: ENDOCRINOLOGY | Facility: CLINIC | Age: 77
End: 2022-12-10

## 2022-12-10 VITALS
DIASTOLIC BLOOD PRESSURE: 60 MMHG | HEIGHT: 72 IN | HEART RATE: 66 BPM | SYSTOLIC BLOOD PRESSURE: 112 MMHG | WEIGHT: 222 LBS | BODY MASS INDEX: 30.07 KG/M2 | OXYGEN SATURATION: 96 %

## 2022-12-10 DIAGNOSIS — E53.8 DEFICIENCY OF OTHER SPECIFIED B GROUP VITAMINS: ICD-10-CM

## 2022-12-10 DIAGNOSIS — E55.9 VITAMIN D DEFICIENCY, UNSPECIFIED: ICD-10-CM

## 2022-12-10 LAB — GLUCOSE BLDC GLUCOMTR-MCNC: 267

## 2022-12-10 PROCEDURE — 99214 OFFICE O/P EST MOD 30 MIN: CPT

## 2022-12-10 PROCEDURE — 82962 GLUCOSE BLOOD TEST: CPT

## 2022-12-10 NOTE — PHYSICAL EXAM
[Alert] : alert [Well Nourished] : well nourished [No Acute Distress] : no acute distress [Well Developed] : well developed [Normal Sclera/Conjunctiva] : normal sclera/conjunctiva [EOMI] : extra ocular movement intact [No Proptosis] : no proptosis [Thyroid Not Enlarged] : the thyroid was not enlarged [No Thyroid Nodules] : no palpable thyroid nodules [No Respiratory Distress] : no respiratory distress [No Accessory Muscle Use] : no accessory muscle use [Clear to Auscultation] : lungs were clear to auscultation bilaterally [Normal S1, S2] : normal S1 and S2 [Normal Rate] : heart rate was normal [Regular Rhythm] : with a regular rhythm [No Edema] : no peripheral edema [Normal Anterior Cervical Nodes] : no anterior cervical lymphadenopathy [Normal Posterior Cervical Nodes] : no posterior cervical lymphadenopathy [Oriented x3] : oriented to person, place, and time [Normal Affect] : the affect was normal [Normal Mood] : the mood was normal [Acanthosis Nigricans] : no acanthosis nigricans [de-identified] : fidgety

## 2022-12-10 NOTE — REVIEW OF SYSTEMS
[Recent Weight Loss (___ Lbs)] : recent weight loss: [unfilled] lbs [SOB on Exertion] : shortness of breath on exertion [Pain/Numbness of Digits] : pain/numbness of digits [Polydipsia] : polydipsia [Chest Pain] : no chest pain [Palpitations] : no palpitations [Shortness Of Breath] : no shortness of breath [Nausea] : no nausea [Abdominal Pain] : no abdominal pain [Vomiting] : no vomiting [Polyuria] : no polyuria

## 2022-12-10 NOTE — ASSESSMENT
[FreeTextEntry1] : 77 year old male with T2DM on CSII, also with hypertension, hyperlipidemia, and vitamin D and vitamin B deficiencies. Glycemic control is suboptimal.\par \par 1. T2DM- variable BG. Difficult to discern patterns. Patient rarely uses Omnipod for insulin bolus and uses insulin pen instead. Does not record insulin bolus in [facility].\par -Suggest slight increase in Novolog to 14 units AC since he appears to have post prandial hyperglycemia.\par -Continue SMBG with Freestyle Moise. \par -Continue with CSII. No changes to current settings.\par -Record insulin bolus wth Freestyle Moise for better interpretation of data at next visit.\par -Check A1c now.\par -Work on lifestyle modifications- diet, exercise, and weight loss- to improve glycemic control.\par -RTO for follow-up in 1 months.\par -Follow-up with neurology for worsening neuropathy. Needs to improve glycemic control.\par \par 2. Hypertension- controlled.\par \par 3. Hyperlipidemia\par -Continue statin and fenofibrate.\par -Check lipids now.\par \par 4. Vitamin D deficiency\par -Continue vitamin D 50,000 weekly\par -Check level now.\par \par 5. Vitamin B deficiency\par -Check level now.\par \par Glucose Sensor Necessity: This patient with diabetes performs 4 or more glucose checks per day utilizing a home blood glucose monitor. The patient is treated with insulin via 3 or more injections daily plus a subcutaneous insulin infusion pump. This patient requires frequent adjustments to their insulin treatment on the basis of therapeutic continuous glucose monitoring results. In addition, the patient has been to our office for an evaluation of their diabetes control within the past 6 months. \par \par

## 2022-12-10 NOTE — HISTORY OF PRESENT ILLNESS
[FreeTextEntry1] : INTERVAL HISTORY: c/o pain in legs and feet, worse at night and unable to sleep. Also with pain in B/L hands. Seeing neurology later this month. +neuropathy\par \par Type: 2\par Severity: moderate\par Duration: years\par Associated symptoms: neuropathy\par \par Had COVID 2020\par \par s/p cholecystectomy \par \par Current Medication Regimen: \par Insulin Aspart via Omnipod insulin pump \par 12 units bolus about 10-15 mins prior the meal\par Changes infusion every 2-3 days, good site rotation\par \par Current B, fasting\par SMBG with Freestyle [facility]. Denies hypoglycemia.\par Avg  \par CV 35.8% \par Very high 15%\par High 32%\par Target 51%\par Low 2%\par Very low 0%\par  \par Interpretation: variable blood glucose. Post prandial spikes.\par Unable to download omnipod.\par \par Last eye exam: 2022. H/o mild background DM retinopathy \par Last foot exam: 2022, q 3 months. +neuropathy\par Kidney disease: none\par Heart disease: none\par \par Diet: not carb counting. Eating smaller portions and skipping meals. Usually eats 2 meals daily, skips breakfast or lunch. Tries to eliminate carbs/starch from diet and only eat fruit.\par Weight: lost 10 pounds in the past 6 months\par Exercise: walks 3x per week for 15-20 minutes

## 2022-12-12 LAB
25(OH)D3 SERPL-MCNC: 42.4 NG/ML
ALBUMIN SERPL ELPH-MCNC: 4.7 G/DL
ALP BLD-CCNC: 112 U/L
ALT SERPL-CCNC: 12 U/L
ANION GAP SERPL CALC-SCNC: 10 MMOL/L
AST SERPL-CCNC: 18 U/L
BASOPHILS # BLD AUTO: 0.11 K/UL
BASOPHILS NFR BLD AUTO: 1.1 %
BILIRUB SERPL-MCNC: 0.4 MG/DL
BUN SERPL-MCNC: 16 MG/DL
CALCIUM SERPL-MCNC: 10 MG/DL
CHLORIDE SERPL-SCNC: 101 MMOL/L
CHOLEST SERPL-MCNC: 129 MG/DL
CO2 SERPL-SCNC: 27 MMOL/L
CREAT SERPL-MCNC: 1.46 MG/DL
CREAT SPEC-SCNC: 173 MG/DL
EGFR: 50 ML/MIN/1.73M2
EOSINOPHIL # BLD AUTO: 0.31 K/UL
EOSINOPHIL NFR BLD AUTO: 3.2 %
ESTIMATED AVERAGE GLUCOSE: 197 MG/DL
GLUCOSE SERPL-MCNC: 290 MG/DL
HBA1C MFR BLD HPLC: 8.5 %
HCT VFR BLD CALC: 43.5 %
HDLC SERPL-MCNC: 37 MG/DL
HGB BLD-MCNC: 14.3 G/DL
IMM GRANULOCYTES NFR BLD AUTO: 0.4 %
LDLC SERPL CALC-MCNC: 60 MG/DL
LYMPHOCYTES # BLD AUTO: 2.54 K/UL
LYMPHOCYTES NFR BLD AUTO: 26.1 %
MAN DIFF?: NORMAL
MCHC RBC-ENTMCNC: 31.8 PG
MCHC RBC-ENTMCNC: 32.9 GM/DL
MCV RBC AUTO: 96.7 FL
MICROALBUMIN 24H UR DL<=1MG/L-MCNC: 5 MG/DL
MICROALBUMIN/CREAT 24H UR-RTO: 29 MG/G
MONOCYTES # BLD AUTO: 0.71 K/UL
MONOCYTES NFR BLD AUTO: 7.3 %
NEUTROPHILS # BLD AUTO: 6.03 K/UL
NEUTROPHILS NFR BLD AUTO: 61.9 %
NONHDLC SERPL-MCNC: 92 MG/DL
PLATELET # BLD AUTO: 203 K/UL
POTASSIUM SERPL-SCNC: 5.8 MMOL/L
PROT SERPL-MCNC: 8 G/DL
RBC # BLD: 4.5 M/UL
RBC # FLD: 13.2 %
SODIUM SERPL-SCNC: 138 MMOL/L
TRIGL SERPL-MCNC: 164 MG/DL
TSH SERPL-ACNC: 1.06 UIU/ML
VIT B12 SERPL-MCNC: 280 PG/ML
WBC # FLD AUTO: 9.74 K/UL

## 2023-01-11 ENCOUNTER — APPOINTMENT (OUTPATIENT)
Dept: ENDOCRINOLOGY | Facility: CLINIC | Age: 78
End: 2023-01-11

## 2023-02-27 ENCOUNTER — APPOINTMENT (OUTPATIENT)
Dept: ENDOCRINOLOGY | Facility: CLINIC | Age: 78
End: 2023-02-27
Payer: MEDICARE

## 2023-02-27 VITALS
WEIGHT: 215 LBS | HEART RATE: 62 BPM | DIASTOLIC BLOOD PRESSURE: 65 MMHG | BODY MASS INDEX: 29.12 KG/M2 | SYSTOLIC BLOOD PRESSURE: 110 MMHG | HEIGHT: 72 IN | OXYGEN SATURATION: 97 %

## 2023-02-27 LAB — GLUCOSE BLDC GLUCOMTR-MCNC: 75

## 2023-02-27 PROCEDURE — 99214 OFFICE O/P EST MOD 30 MIN: CPT | Mod: 25

## 2023-02-27 PROCEDURE — 82962 GLUCOSE BLOOD TEST: CPT

## 2023-03-05 NOTE — PHYSICAL EXAM
[Alert] : alert [Well Nourished] : well nourished [No Acute Distress] : no acute distress [Well Developed] : well developed [Normal Sclera/Conjunctiva] : normal sclera/conjunctiva [EOMI] : extra ocular movement intact [No Proptosis] : no proptosis [Thyroid Not Enlarged] : the thyroid was not enlarged [No Thyroid Nodules] : no palpable thyroid nodules [No Respiratory Distress] : no respiratory distress [No Accessory Muscle Use] : no accessory muscle use [Clear to Auscultation] : lungs were clear to auscultation bilaterally [Normal S1, S2] : normal S1 and S2 [Normal Rate] : heart rate was normal [Regular Rhythm] : with a regular rhythm [No Edema] : no peripheral edema [Normal Anterior Cervical Nodes] : no anterior cervical lymphadenopathy [Oriented x3] : oriented to person, place, and time [Normal Affect] : the affect was normal [Normal Mood] : the mood was normal [Acanthosis Nigricans] : no acanthosis nigricans [de-identified] : fidgety

## 2023-03-05 NOTE — ASSESSMENT
[FreeTextEntry1] : 77 year old male with T2DM on CSII, also with hypertension, hyperlipidemia, and vitamin D and vitamin B deficiencies. Glycemic control is suboptimal.\par \par 1. T2DM- variable BG. Difficult to discern patterns. Pring to bolus via pump\par \par needs new set up \par  will meet with [facility] \par \par -Continue with CSII. No changes to current settings.\par -Record insulin bolus wth Freestyle Moise for better interpretation of data at next visit.\par Also make diet record as best as possible so can determine if t is appropriately bolusing for  meal types \par -Check A1c now.\par -Work on lifestyle modifications- diet, exercise, and weight loss- to improve glycemic control.\par -RTO for follow-up in 1 months.\par -Follow-up with neurology for worsening neuropathy. Needs to improve glycemic control.\par \par 2. Hypertension- controlled.\par \par 3. Hyperlipidemia\par -Continue statin and fenofibrate.\par -Check lipids now.\par \par 4. Vitamin D deficiency\par -Continue vitamin D 50,000 weekly\par -Check level now.\par \par 5. Vitamin B deficiency\par -Check level now.\par \par Glucose Sensor Necessity: This patient with diabetes performs 4 or more glucose checks per day utilizing a home blood glucose monitor. The patient is treated with insulin via 3 or more injections daily plus a subcutaneous insulin infusion pump. This patient requires frequent adjustments to their insulin treatment on the basis of therapeutic continuous glucose monitoring results. In addition, the patient has been to our office for an evaluation of their diabetes control within the past 6 months. \par \par

## 2023-03-05 NOTE — HISTORY OF PRESENT ILLNESS
[FreeTextEntry1] : INTERVAL HISTORY: overall has beenfeeling ok \par  BS still not under good conrol\par \par  pt states he was trying to bolus throught the pump \par Type: 2\par Severity: moderate\par Duration: years\par Associated symptoms: neuropathy\par \par Had COVID 2020\par \par s/p cholecystectomy \par \par Current Medication Regimen: \par Insulin Aspart via Omnipod insulin pump \par 12 units bolus about 10-15 mins prior the meal\par Changes infusion every 2-3 days, good site rotation\par \par Current B, fasting\par SMBG with Freestyle [facility]. Denies hypoglycemia.\par Avg  \par CV 35.8% \par Very high 15%\par High 32%\par Target 51%\par Low 2%\par Very low 0%\par  \par Interpretation: variable blood glucose. Post prandial spikes.\par Unable to download omnipod.\par \par Last eye exam: 2022. H/o mild background DM retinopathy \par Last foot exam: 2022, q 3 months. +neuropathy\par Kidney disease: none\par Heart disease: none\par \par Diet: not carb counting. Eating smaller portions and skipping meals. Usually eats 2 meals daily, skips breakfast or lunch. Tries to eliminate carbs/starch from diet and only eat fruit.\par Weight: lost 10 pounds in the past 6 months\par Exercise: walks 3x per week for 15-20 minutes

## 2023-03-06 LAB
25(OH)D3 SERPL-MCNC: 52.1 NG/ML
ALBUMIN SERPL ELPH-MCNC: 4.6 G/DL
ALP BLD-CCNC: 115 U/L
ALT SERPL-CCNC: 12 U/L
ANION GAP SERPL CALC-SCNC: 14 MMOL/L
APPEARANCE: CLEAR
AST SERPL-CCNC: 18 U/L
BASOPHILS # BLD AUTO: 0.1 K/UL
BASOPHILS NFR BLD AUTO: 0.9 %
BILIRUB SERPL-MCNC: 0.5 MG/DL
BILIRUBIN URINE: NEGATIVE
BLOOD URINE: NEGATIVE
BUN SERPL-MCNC: 20 MG/DL
CALCIUM SERPL-MCNC: 9.9 MG/DL
CHLORIDE SERPL-SCNC: 104 MMOL/L
CHOLEST SERPL-MCNC: 118 MG/DL
CO2 SERPL-SCNC: 27 MMOL/L
COLOR: YELLOW
CREAT SERPL-MCNC: 1.46 MG/DL
CREAT SPEC-SCNC: 151 MG/DL
EGFR: 50 ML/MIN/1.73M2
EOSINOPHIL # BLD AUTO: 0.39 K/UL
EOSINOPHIL NFR BLD AUTO: 3.6 %
ESTIMATED AVERAGE GLUCOSE: 235 MG/DL
GLUCOSE QUALITATIVE U: NEGATIVE
GLUCOSE SERPL-MCNC: 61 MG/DL
HBA1C MFR BLD HPLC: 9.8 %
HCT VFR BLD CALC: 43.3 %
HDLC SERPL-MCNC: 34 MG/DL
HGB BLD-MCNC: 14.1 G/DL
IMM GRANULOCYTES NFR BLD AUTO: 0.5 %
KETONES URINE: NEGATIVE
LDLC SERPL CALC-MCNC: 53 MG/DL
LEUKOCYTE ESTERASE URINE: NEGATIVE
LYMPHOCYTES # BLD AUTO: 2.5 K/UL
LYMPHOCYTES NFR BLD AUTO: 23 %
MAGNESIUM SERPL-MCNC: 2.2 MG/DL
MAN DIFF?: NORMAL
MCHC RBC-ENTMCNC: 30.9 PG
MCHC RBC-ENTMCNC: 32.6 GM/DL
MCV RBC AUTO: 94.7 FL
MICROALBUMIN 24H UR DL<=1MG/L-MCNC: 4.9 MG/DL
MICROALBUMIN/CREAT 24H UR-RTO: 33 MG/G
MONOCYTES # BLD AUTO: 0.94 K/UL
MONOCYTES NFR BLD AUTO: 8.7 %
NEUTROPHILS # BLD AUTO: 6.88 K/UL
NEUTROPHILS NFR BLD AUTO: 63.3 %
NITRITE URINE: NEGATIVE
NONHDLC SERPL-MCNC: 85 MG/DL
PH URINE: 6
PLATELET # BLD AUTO: 208 K/UL
POTASSIUM SERPL-SCNC: 4.9 MMOL/L
PROT SERPL-MCNC: 7.8 G/DL
PROTEIN URINE: NORMAL
RBC # BLD: 4.57 M/UL
RBC # FLD: 13.4 %
SODIUM SERPL-SCNC: 145 MMOL/L
SPECIFIC GRAVITY URINE: 1.02
T3FREE SERPL-MCNC: 3.12 PG/ML
T4 FREE SERPL-MCNC: 1 NG/DL
TRIGL SERPL-MCNC: 158 MG/DL
TSH SERPL-ACNC: 2.24 UIU/ML
UROBILINOGEN URINE: NORMAL
VIT B12 SERPL-MCNC: 553 PG/ML
WBC # FLD AUTO: 10.86 K/UL

## 2023-05-02 ENCOUNTER — RX ONLY (RX ONLY)
Age: 78
End: 2023-05-02

## 2023-05-02 ENCOUNTER — OFFICE (OUTPATIENT)
Dept: URBAN - METROPOLITAN AREA CLINIC 105 | Facility: CLINIC | Age: 78
Setting detail: OPHTHALMOLOGY
End: 2023-05-02
Payer: MEDICARE

## 2023-05-02 DIAGNOSIS — H00.021: ICD-10-CM

## 2023-05-02 DIAGNOSIS — H02.822: ICD-10-CM

## 2023-05-02 PROCEDURE — 92002 INTRM OPH EXAM NEW PATIENT: CPT | Performed by: OPTOMETRIST

## 2023-05-02 ASSESSMENT — SPHEQUIV_DERIVED
OD_SPHEQUIV: -0.875
OS_SPHEQUIV: -1.375

## 2023-05-02 ASSESSMENT — KERATOMETRY
OS_K1POWER_DIOPTERS: 47.00
OS_AXISANGLE_DEGREES: 090
OS_K2POWER_DIOPTERS: 47.00
OD_K2POWER_DIOPTERS: 47.25
OD_K1POWER_DIOPTERS: 46.50
OD_AXISANGLE_DEGREES: 117

## 2023-05-02 ASSESSMENT — LID EXAM ASSESSMENTS
OS_BLEPHARITIS: LLL T
OS_COMMENTS: CRUSTING

## 2023-05-02 ASSESSMENT — REFRACTION_AUTOREFRACTION
OD_AXIS: 106
OS_SPHERE: -0.75
OD_CYLINDER: -1.25
OS_CYLINDER: -1.25
OS_AXIS: 071
OD_SPHERE: -0.25

## 2023-05-02 ASSESSMENT — VISUAL ACUITY
OD_BCVA: 20/50
OS_BCVA: 20/30-1

## 2023-05-02 ASSESSMENT — CONFRONTATIONAL VISUAL FIELD TEST (CVF)
OS_FINDINGS: FULL
OD_FINDINGS: FULL

## 2023-05-02 ASSESSMENT — REFRACTION_CURRENTRX
OS_SPHERE: +2.00
OD_SPHERE: +2.00
OD_OVR_VA: 20/
OS_OVR_VA: 20/

## 2023-05-02 ASSESSMENT — AXIALLENGTH_DERIVED
OS_AL: 22.8651
OD_AL: 22.7261

## 2023-05-08 ENCOUNTER — OFFICE (OUTPATIENT)
Dept: URBAN - METROPOLITAN AREA CLINIC 100 | Facility: CLINIC | Age: 78
Setting detail: OPHTHALMOLOGY
End: 2023-05-08
Payer: MEDICARE

## 2023-05-08 ENCOUNTER — RX ONLY (RX ONLY)
Age: 78
End: 2023-05-08

## 2023-05-08 DIAGNOSIS — H00.12: ICD-10-CM

## 2023-05-08 DIAGNOSIS — H01.002: ICD-10-CM

## 2023-05-08 DIAGNOSIS — H00.15: ICD-10-CM

## 2023-05-08 DIAGNOSIS — H01.005: ICD-10-CM

## 2023-05-08 DIAGNOSIS — H00.14: ICD-10-CM

## 2023-05-08 DIAGNOSIS — H00.11: ICD-10-CM

## 2023-05-08 PROCEDURE — 92285 EXTERNAL OCULAR PHOTOGRAPHY: CPT | Performed by: OPHTHALMOLOGY

## 2023-05-08 PROCEDURE — 99213 OFFICE O/P EST LOW 20 MIN: CPT | Performed by: OPHTHALMOLOGY

## 2023-05-08 PROCEDURE — 67805 REMOVE EYELID LESIONS: CPT | Performed by: OPHTHALMOLOGY

## 2023-05-08 ASSESSMENT — REFRACTION_AUTOREFRACTION
OD_SPHERE: -0.25
OS_SPHERE: -0.75
OS_CYLINDER: -1.25
OD_AXIS: 106
OD_CYLINDER: -1.25
OS_AXIS: 071

## 2023-05-08 ASSESSMENT — KERATOMETRY
OS_K2POWER_DIOPTERS: 47.00
OD_K1POWER_DIOPTERS: 46.50
OD_K2POWER_DIOPTERS: 47.25
OS_AXISANGLE_DEGREES: 090
OD_AXISANGLE_DEGREES: 117
OS_K1POWER_DIOPTERS: 47.00

## 2023-05-08 ASSESSMENT — SPHEQUIV_DERIVED
OS_SPHEQUIV: -1.375
OD_SPHEQUIV: -0.875

## 2023-05-08 ASSESSMENT — LID EXAM ASSESSMENTS
OD_BLEPHARITIS: 3+
OS_BLEPHARITIS: 3+

## 2023-05-08 ASSESSMENT — CONFRONTATIONAL VISUAL FIELD TEST (CVF)
OS_FINDINGS: FULL
OD_FINDINGS: FULL

## 2023-05-08 ASSESSMENT — AXIALLENGTH_DERIVED
OD_AL: 22.7261
OS_AL: 22.8651

## 2023-05-08 ASSESSMENT — VISUAL ACUITY
OS_BCVA: 20/30-1
OD_BCVA: 20/60-1

## 2023-05-19 ENCOUNTER — APPOINTMENT (OUTPATIENT)
Dept: ENDOCRINOLOGY | Facility: CLINIC | Age: 78
End: 2023-05-19
Payer: MEDICARE

## 2023-05-19 PROCEDURE — 36415 COLL VENOUS BLD VENIPUNCTURE: CPT

## 2023-05-20 LAB
ALBUMIN SERPL ELPH-MCNC: 4.3 G/DL
ALP BLD-CCNC: 104 U/L
ALT SERPL-CCNC: 13 U/L
ANION GAP SERPL CALC-SCNC: 12 MMOL/L
AST SERPL-CCNC: 17 U/L
BILIRUB SERPL-MCNC: 0.4 MG/DL
BUN SERPL-MCNC: 16 MG/DL
CALCIUM SERPL-MCNC: 9.5 MG/DL
CHLORIDE SERPL-SCNC: 104 MMOL/L
CHOLEST SERPL-MCNC: 134 MG/DL
CO2 SERPL-SCNC: 24 MMOL/L
CREAT SERPL-MCNC: 1.46 MG/DL
CREAT SPEC-SCNC: 223 MG/DL
EGFR: 50 ML/MIN/1.73M2
ESTIMATED AVERAGE GLUCOSE: 235 MG/DL
GLUCOSE SERPL-MCNC: 312 MG/DL
HBA1C MFR BLD HPLC: 9.8 %
HDLC SERPL-MCNC: 34 MG/DL
LDLC SERPL CALC-MCNC: 55 MG/DL
MICROALBUMIN 24H UR DL<=1MG/L-MCNC: 4.2 MG/DL
MICROALBUMIN/CREAT 24H UR-RTO: 19 MG/G
NONHDLC SERPL-MCNC: 100 MG/DL
POTASSIUM SERPL-SCNC: 5.3 MMOL/L
PROT SERPL-MCNC: 7.2 G/DL
SODIUM SERPL-SCNC: 140 MMOL/L
T3 SERPL-MCNC: 110 NG/DL
T4 FREE SERPL-MCNC: 0.9 NG/DL
TRIGL SERPL-MCNC: 227 MG/DL
TSH SERPL-ACNC: 1.24 UIU/ML

## 2023-05-25 ENCOUNTER — OFFICE (OUTPATIENT)
Dept: URBAN - METROPOLITAN AREA CLINIC 100 | Facility: CLINIC | Age: 78
Setting detail: OPHTHALMOLOGY
End: 2023-05-25
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
## 2023-05-25 PROCEDURE — NO SHOW FE NO SHOW FEE: Performed by: OPHTHALMOLOGY

## 2023-05-27 ENCOUNTER — APPOINTMENT (OUTPATIENT)
Dept: ENDOCRINOLOGY | Facility: CLINIC | Age: 78
End: 2023-05-27
Payer: MEDICARE

## 2023-05-27 DIAGNOSIS — I10 ESSENTIAL (PRIMARY) HYPERTENSION: ICD-10-CM

## 2023-05-27 DIAGNOSIS — E78.5 HYPERLIPIDEMIA, UNSPECIFIED: ICD-10-CM

## 2023-05-27 DIAGNOSIS — E11.8 TYPE 2 DIABETES MELLITUS WITH UNSPECIFIED COMPLICATIONS: ICD-10-CM

## 2023-05-27 PROCEDURE — 99214 OFFICE O/P EST MOD 30 MIN: CPT | Mod: 25,95

## 2023-05-27 NOTE — REASON FOR VISIT
[Home] : at home, [unfilled] , at the time of the visit. [Patient] : the patient [Self] : self [Follow - Up] : a follow-up visit [DM Type 2] : DM Type 2 [Other Location: e.g. Home (Enter Location, City,State)___] : at [unfilled]

## 2023-05-27 NOTE — REVIEW OF SYSTEMS
[Pain/Numbness of Digits] : pain/numbness of digits [Polydipsia] : polydipsia [Recent Weight Gain (___ Lbs)] : no recent weight gain [Recent Weight Loss (___ Lbs)] : no recent weight loss [Chest Pain] : no chest pain [Palpitations] : no palpitations [Shortness Of Breath] : no shortness of breath [Nausea] : no nausea [Abdominal Pain] : no abdominal pain [Vomiting] : no vomiting [Polyuria] : no polyuria

## 2023-05-27 NOTE — HISTORY OF PRESENT ILLNESS
[FreeTextEntry1] : INTERVAL HISTORY: Never received refill of Omnipod supplies, so has been off pump for 2 weeks.\par Started Trazodone 50 mg half tablet daily for insomnia.\par \par Type: 2\par Severity: moderate\par Duration: years\par Associated symptoms: neuropathy\par \par Had COVID March 2020\par \par s/p cholecystectomy \par Denies h/o pancreatitis and family history of pancreatic cancer and thyroid cancer.\par \par Current Medication Regimen: \par Insulin Aspart via Omnipod insulin pump --->off for the past 2 weeks due to lack of supplies \par Novolog 12-16 units before meals, adjusts based on BG prior the meal but does not always test so often "I just guess."\par Levemir 48 units HS\par \par SMBG with Freestyle [facility]. Denies hypoglycemia. Reports BG has been high, in the 300s.\par Avg \par CV 35.8% \par Time CGM Active: 41%\par Very high 24%\par High 49%\par Target 26%\par Low 1%\par Very low 0%\par  \par Interpretation: variable blood glucose with large gaps in data.\par Unable to download omnipod.\par \par Last eye exam: September 2022. H/o mild background DM retinopathy \par Last foot exam: September 2022, q 3 months. +neuropathy on Lyrica.\par Kidney disease: none\par Heart disease: none\par \par Diet: not carb counting. Usually eats 2 meals daily, skips breakfast or lunch. Eating more rice and beans lately\par Weight: stable\par Exercise: walks 2-3x per week for 15-20 minutes

## 2023-05-27 NOTE — ASSESSMENT
[FreeTextEntry1] : 77 year old male with T2DM on CSII, also with hypertension, hyperlipidemia, and vitamin D and vitamin B deficiencies. Glycemic control has worsened.\par \par 1. T2DM- A1c up to 9.8%. CGMS reveals BG above target range 73% of the time. Large gaps in data due to infrequent testing so difficult to discern patterns for insulin adjustment.\par -Start Ozempic 0.25 mg weekly x 4 weeks then increase to 0.5 mg weekly. Reviewed potential side effects. Call office with nausea/vomiting and/or hypoglycemia.\par -Decrease Levemir to 45 units daily.\par -Decrease Novolog to 10-14 units AC.\par -Continue SMBG with Freestyle [facility]. Needs to test BG more frequently, at least AC and HS.\par -Will send RX for refill of pump suppllies. May need to adjust settings on Ozempic.\par -Work on lifestyle modifications- diet, exercise, and weight loss- to improve glycemic control.\par -Advised follow-up with [facility] to review diabetic diet.\par -Repeat A1c and RTO for follow-up with MD In 3 months.\par -Increase exercise. At least 20-30 minutes 3-4x per week.\par \par 2. Hypertension- controlled.\par \par 3. Hyperlipidemia- LDL at target, triglycerides high due to loss of glycemic control.\par -Continue statin and fenofibrate.\par -Triglycerides should improve with better glycemic control. \par -Low carb/low fat diet.\par -Repeat lipids in 3 months.\par \par 4. Vitamin D deficiency\par -Continue vitamin D 50,000 weekly\par -Repeat level with next labs in 3 months.\par \par 5. Vitamin B deficiency\par -Repeat level with next labs in 3 months.\par \par Glucose Sensor Necessity: This patient with diabetes performs 4 or more glucose checks per day utilizing a home blood glucose monitor. The patient is treated with insulin via 3 or more injections daily plus a subcutaneous insulin infusion pump. This patient requires frequent adjustments to their insulin treatment on the basis of therapeutic continuous glucose monitoring results. In addition, the patient has been to our office for an evaluation of their diabetes control within the past 6 months.

## 2023-06-16 ENCOUNTER — APPOINTMENT (OUTPATIENT)
Dept: ENDOCRINOLOGY | Facility: CLINIC | Age: 78
End: 2023-06-16
Payer: MEDICARE

## 2023-06-16 PROCEDURE — 97803 MED NUTRITION INDIV SUBSEQ: CPT | Mod: GA

## 2023-07-20 RX ORDER — INSULIN ASPART 100 [IU]/ML
100 INJECTION, SOLUTION INTRAVENOUS; SUBCUTANEOUS
Qty: 90 | Refills: 1 | Status: COMPLETED | COMMUNITY
Start: 2019-01-09 | End: 2023-07-20

## 2023-08-18 ENCOUNTER — APPOINTMENT (OUTPATIENT)
Dept: ENDOCRINOLOGY | Facility: CLINIC | Age: 78
End: 2023-08-18
Payer: MEDICARE

## 2023-08-18 DIAGNOSIS — E11.65 TYPE 2 DIABETES MELLITUS WITH HYPERGLYCEMIA: ICD-10-CM

## 2023-08-18 PROCEDURE — G0108 DIAB MANAGE TRN  PER INDIV: CPT | Mod: GA

## 2023-08-27 ENCOUNTER — RX RENEWAL (OUTPATIENT)
Age: 78
End: 2023-08-27

## 2023-09-08 RX ORDER — SEMAGLUTIDE 0.68 MG/ML
2 INJECTION, SOLUTION SUBCUTANEOUS
Qty: 3 | Refills: 1 | Status: DISCONTINUED | COMMUNITY
Start: 2023-05-27 | End: 2023-09-08

## 2023-12-29 RX ORDER — INSULIN PUMP CONTROLLER
EACH MISCELLANEOUS
Qty: 1 | Refills: 0 | Status: ACTIVE | COMMUNITY
Start: 2023-12-29 | End: 1900-01-01

## 2023-12-29 RX ORDER — INSULIN PUMP CONTROLLER
EACH MISCELLANEOUS
Qty: 2 | Refills: 11 | Status: ACTIVE | COMMUNITY
Start: 2023-12-29 | End: 1900-01-01

## 2024-01-22 RX ORDER — INSULIN ASPART 100 [IU]/ML
100 INJECTION, SOLUTION INTRAVENOUS; SUBCUTANEOUS
Qty: 30 | Refills: 0 | Status: ACTIVE | COMMUNITY
Start: 2019-12-06 | End: 1900-01-01

## 2024-01-26 ENCOUNTER — NON-APPOINTMENT (OUTPATIENT)
Age: 79
End: 2024-01-26

## 2024-01-30 ENCOUNTER — APPOINTMENT (OUTPATIENT)
Dept: ENDOCRINOLOGY | Facility: CLINIC | Age: 79
End: 2024-01-30
Payer: MEDICARE

## 2024-01-30 VITALS
WEIGHT: 216 LBS | BODY MASS INDEX: 29.26 KG/M2 | DIASTOLIC BLOOD PRESSURE: 64 MMHG | HEART RATE: 78 BPM | SYSTOLIC BLOOD PRESSURE: 112 MMHG | OXYGEN SATURATION: 96 % | HEIGHT: 72 IN

## 2024-01-30 PROCEDURE — 95251 CONT GLUC MNTR ANALYSIS I&R: CPT

## 2024-01-30 PROCEDURE — 99214 OFFICE O/P EST MOD 30 MIN: CPT

## 2024-01-30 PROCEDURE — 82962 GLUCOSE BLOOD TEST: CPT

## 2024-02-01 NOTE — ASSESSMENT
[FreeTextEntry1] : 77 year old male with T2DM on CSII, also with hypertension, hyperlipidemia, and vitamin D and vitamin B deficiencies. Glycemic control has worsened.  1. T2DM- A1c - await labs with Dr Dang  ON TRADJENTA 5 MG QD   tried ozmepic  intoerant to it  cont current pump setting s  -Work on lifestyle modifications- diet, exercise, and weight loss- to improve glycemic control. -Advised follow-up with [facility] to review diabetic diet. -Repeat A1c and RTO for follow-up with MD In 3 months. -Increase exercise. At least 20-30 minutes 3-4x per week.  2. Hypertension- controlled.  3. Hyperlipidemia-  await labs from Dr Rodriguez office  -Continue statin and fenofibrate. -Triglycerides should improve with better glycemic control.  -Low carb/low fat diet. -Repeat lipids in 3 months.  4. Vitamin D deficiency -Continue vitamin D 50,000 weekly -Repeat level with next labs in 3 months.  5. Vitamin B deficiency -Repeat level with next labs in 3 months.  Glucose Sensor Necessity: This patient with diabetes performs 4 or more glucose checks per day utilizing a home blood glucose monitor. The patient is treated with insulin via 3 or more injections daily plus a subcutaneous insulin infusion pump. This patient requires frequent adjustments to their insulin treatment on the basis of therapeutic continuous glucose monitoring results. In addition, the patient has been to our office for an evaluation of their diabetes control within the past 6 months.

## 2024-02-01 NOTE — HISTORY OF PRESENT ILLNESS
[FreeTextEntry1] : INTERVAL HISTORY: oN oMNIpoD dASH WITH dEXCOM g7s. Started Trazodone 50 mg half tablet daily for insomnia.  Type: 2 Severity: moderate Duration: years Associated symptoms: neuropathy  Had COVID March 2020 RECENTLY HAD pna-- DOING BETER   s/p cholecystectomy  Denies h/o pancreatitis and family history of pancreatic cancer and thyroid cancer.  Current Medication Regimen:  Insulin Aspart via Omnipod idash  INSULIN pump HAS BACK UP INSULIN  Tradjenta 5 mg qd : IF OFF PUMP Novolog 12-16 units before meals, adjusts based on BG prior the meal but does not always test so often "I just guess." Levemir 48 units HS  SMBG with dEXCOM   Denies hypoglycemia. Avg  CV 35.8%  Time CGM Active: 41% Very high 24% High 49% Target 26% Low 1% Very low 0%   Interpretation: variable blood glucose with large gaps in data. Unable to download omnipod.  Last eye exam: September 2022. H/o mild background DM retinopathy  Last foot exam: September 2022, q 3 months. +neuropathy on Lyrica. Kidney disease: none Heart disease: none  Diet: not carb counting. Usually eats 2 meals daily, skips breakfast or lunch. Eating more rice and beans lately Weight: stable Exercise: walks 2-3x per week for 15-20 minutes

## 2024-02-05 LAB — GLUCOSE BLDC GLUCOMTR-MCNC: 76

## 2024-02-05 RX ORDER — INSULIN DETEMIR 100 [IU]/ML
100 INJECTION, SOLUTION SUBCUTANEOUS DAILY
Qty: 3 | Refills: 0 | Status: COMPLETED | COMMUNITY
End: 2024-02-05

## 2024-02-05 RX ORDER — INSULIN DEGLUDEC INJECTION 100 U/ML
100 INJECTION, SOLUTION SUBCUTANEOUS DAILY
Qty: 3 | Refills: 3 | Status: ACTIVE | COMMUNITY
Start: 2024-02-05 | End: 1900-01-01

## 2024-02-16 RX ORDER — LINAGLIPTIN 5 MG/1
5 TABLET, FILM COATED ORAL
Qty: 90 | Refills: 1 | Status: ACTIVE | COMMUNITY
Start: 2023-09-08 | End: 1900-01-01

## 2024-05-07 ENCOUNTER — RX RENEWAL (OUTPATIENT)
Age: 79
End: 2024-05-07

## 2024-05-07 RX ORDER — INSULIN ASPART 100 [IU]/ML
100 INJECTION, SOLUTION INTRAVENOUS; SUBCUTANEOUS
Qty: 90 | Refills: 0 | Status: ACTIVE | COMMUNITY
Start: 2022-09-21 | End: 1900-01-01

## 2024-05-08 ENCOUNTER — RX RENEWAL (OUTPATIENT)
Age: 79
End: 2024-05-08

## 2024-05-08 RX ORDER — ERGOCALCIFEROL 1.25 MG/1
1.25 MG CAPSULE, LIQUID FILLED ORAL
Qty: 12 | Refills: 0 | Status: ACTIVE | COMMUNITY
Start: 2019-04-01 | End: 1900-01-01

## 2024-07-10 LAB
HBA1C MFR BLD HPLC: 8.1
LDLC SERPL DIRECT ASSAY-MCNC: 94
MICROALBUMIN/CREAT 24H UR-RTO: 12
TSH SERPL-ACNC: 2.19

## 2024-07-11 ENCOUNTER — NON-APPOINTMENT (OUTPATIENT)
Age: 79
End: 2024-07-11

## 2024-07-12 ENCOUNTER — APPOINTMENT (OUTPATIENT)
Dept: ENDOCRINOLOGY | Facility: CLINIC | Age: 79
End: 2024-07-12

## 2024-10-29 ENCOUNTER — TRANSCRIPTION ENCOUNTER (OUTPATIENT)
Age: 79
End: 2024-10-29

## 2024-11-15 ENCOUNTER — APPOINTMENT (OUTPATIENT)
Dept: ENDOCRINOLOGY | Facility: CLINIC | Age: 79
End: 2024-11-15
Payer: COMMERCIAL

## 2024-11-15 VITALS — HEIGHT: 72 IN | OXYGEN SATURATION: 97 % | BODY MASS INDEX: 28.71 KG/M2 | HEART RATE: 72 BPM | WEIGHT: 212 LBS

## 2024-11-15 VITALS — SYSTOLIC BLOOD PRESSURE: 126 MMHG | DIASTOLIC BLOOD PRESSURE: 70 MMHG

## 2024-11-15 DIAGNOSIS — E11.8 TYPE 2 DIABETES MELLITUS WITH UNSPECIFIED COMPLICATIONS: ICD-10-CM

## 2024-11-15 DIAGNOSIS — E78.5 HYPERLIPIDEMIA, UNSPECIFIED: ICD-10-CM

## 2024-11-15 DIAGNOSIS — I10 ESSENTIAL (PRIMARY) HYPERTENSION: ICD-10-CM

## 2024-11-15 LAB
GLUCOSE BLDC GLUCOMTR-MCNC: 160
HBA1C MFR BLD HPLC: 10.7
LDLC SERPL DIRECT ASSAY-MCNC: 89

## 2024-11-15 PROCEDURE — 95251 CONT GLUC MNTR ANALYSIS I&R: CPT

## 2024-11-15 PROCEDURE — 82962 GLUCOSE BLOOD TEST: CPT

## 2024-11-15 PROCEDURE — 99214 OFFICE O/P EST MOD 30 MIN: CPT

## 2025-02-27 ENCOUNTER — APPOINTMENT (OUTPATIENT)
Dept: ENDOCRINOLOGY | Facility: CLINIC | Age: 80
End: 2025-02-27

## 2025-06-27 ENCOUNTER — APPOINTMENT (OUTPATIENT)
Dept: ENDOCRINOLOGY | Facility: CLINIC | Age: 80
End: 2025-06-27
Payer: MEDICARE

## 2025-06-27 VITALS
DIASTOLIC BLOOD PRESSURE: 64 MMHG | HEART RATE: 76 BPM | HEIGHT: 72 IN | OXYGEN SATURATION: 92 % | WEIGHT: 213 LBS | SYSTOLIC BLOOD PRESSURE: 120 MMHG | BODY MASS INDEX: 28.85 KG/M2

## 2025-06-27 LAB
GLUCOSE BLDC GLUCOMTR-MCNC: 221
HBA1C MFR BLD HPLC: 10.2
LDLC SERPL DIRECT ASSAY-MCNC: 84
TSH SERPL-ACNC: 1.45

## 2025-06-27 PROCEDURE — G2211 COMPLEX E/M VISIT ADD ON: CPT

## 2025-06-27 PROCEDURE — 95251 CONT GLUC MNTR ANALYSIS I&R: CPT

## 2025-06-27 PROCEDURE — 82962 GLUCOSE BLOOD TEST: CPT

## 2025-06-27 PROCEDURE — 99214 OFFICE O/P EST MOD 30 MIN: CPT

## 2025-07-24 ENCOUNTER — APPOINTMENT (OUTPATIENT)
Dept: ENDOCRINOLOGY | Facility: CLINIC | Age: 80
End: 2025-07-24
Payer: MEDICARE

## 2025-07-24 DIAGNOSIS — E11.65 TYPE 2 DIABETES MELLITUS WITH HYPERGLYCEMIA: ICD-10-CM

## 2025-07-24 PROCEDURE — G0108 DIAB MANAGE TRN  PER INDIV: CPT | Mod: GA

## 2025-07-24 RX ORDER — INSULIN PMP CART,AUT,G6/7,CNTR
EACH SUBCUTANEOUS
Qty: 2 | Refills: 5 | Status: ACTIVE | COMMUNITY
Start: 2025-07-24 | End: 1900-01-01

## 2025-07-24 RX ORDER — INSULIN PMP CART,AUT,G6/7,CNTR
EACH SUBCUTANEOUS
Qty: 1 | Refills: 0 | Status: ACTIVE | COMMUNITY
Start: 2025-07-24 | End: 1900-01-01

## 2025-08-21 ENCOUNTER — APPOINTMENT (OUTPATIENT)
Dept: ENDOCRINOLOGY | Facility: CLINIC | Age: 80
End: 2025-08-21
Payer: MEDICARE

## 2025-08-21 DIAGNOSIS — E11.65 TYPE 2 DIABETES MELLITUS WITH HYPERGLYCEMIA: ICD-10-CM

## 2025-08-21 PROCEDURE — G0108 DIAB MANAGE TRN  PER INDIV: CPT

## 2025-09-12 ENCOUNTER — TRANSCRIPTION ENCOUNTER (OUTPATIENT)
Age: 80
End: 2025-09-12

## 2025-09-25 PROBLEM — R79.89 LOW VITAMIN B12 LEVEL: Status: ACTIVE | Noted: 2018-09-07
